# Patient Record
Sex: MALE | URBAN - METROPOLITAN AREA
[De-identification: names, ages, dates, MRNs, and addresses within clinical notes are randomized per-mention and may not be internally consistent; named-entity substitution may affect disease eponyms.]

---

## 2023-01-15 ENCOUNTER — HOSPITAL ENCOUNTER (INPATIENT)
Facility: MEDICAL CENTER | Age: 68
LOS: 10 days | DRG: 339 | End: 2023-01-25
Attending: EMERGENCY MEDICINE | Admitting: SURGERY
Payer: MEDICARE

## 2023-01-15 ENCOUNTER — ANESTHESIA EVENT (OUTPATIENT)
Dept: SURGERY | Facility: MEDICAL CENTER | Age: 68
DRG: 339 | End: 2023-01-15
Payer: MEDICARE

## 2023-01-15 ENCOUNTER — ANESTHESIA (OUTPATIENT)
Dept: SURGERY | Facility: MEDICAL CENTER | Age: 68
DRG: 339 | End: 2023-01-15
Payer: MEDICARE

## 2023-01-15 ENCOUNTER — APPOINTMENT (OUTPATIENT)
Dept: RADIOLOGY | Facility: MEDICAL CENTER | Age: 68
DRG: 339 | End: 2023-01-15
Attending: EMERGENCY MEDICINE
Payer: MEDICARE

## 2023-01-15 DIAGNOSIS — K35.32 ACUTE APPENDICITIS WITH PERFORATION AND LOCALIZED PERITONITIS, WITHOUT ABSCESS OR GANGRENE: ICD-10-CM

## 2023-01-15 DIAGNOSIS — K35.30 ACUTE APPENDICITIS WITH LOCALIZED PERITONITIS, WITHOUT PERFORATION, ABSCESS, OR GANGRENE: ICD-10-CM

## 2023-01-15 LAB
ALBUMIN SERPL BCP-MCNC: 4.7 G/DL (ref 3.2–4.9)
ALBUMIN/GLOB SERPL: 1.6 G/DL
ALP SERPL-CCNC: 99 U/L (ref 30–99)
ALT SERPL-CCNC: 16 U/L (ref 2–50)
ANION GAP SERPL CALC-SCNC: 12 MMOL/L (ref 7–16)
APPEARANCE UR: CLEAR
AST SERPL-CCNC: 16 U/L (ref 12–45)
BACTERIA #/AREA URNS HPF: NEGATIVE /HPF
BASOPHILS # BLD AUTO: 0.2 % (ref 0–1.8)
BASOPHILS # BLD: 0.05 K/UL (ref 0–0.12)
BILIRUB SERPL-MCNC: 0.9 MG/DL (ref 0.1–1.5)
BILIRUB UR QL STRIP.AUTO: NEGATIVE
BUN SERPL-MCNC: 13 MG/DL (ref 8–22)
CALCIUM ALBUM COR SERPL-MCNC: 9.1 MG/DL (ref 8.5–10.5)
CALCIUM SERPL-MCNC: 9.7 MG/DL (ref 8.5–10.5)
CHLORIDE SERPL-SCNC: 97 MMOL/L (ref 96–112)
CO2 SERPL-SCNC: 28 MMOL/L (ref 20–33)
COLOR UR: YELLOW
CREAT SERPL-MCNC: 0.99 MG/DL (ref 0.5–1.4)
EOSINOPHIL # BLD AUTO: 0.01 K/UL (ref 0–0.51)
EOSINOPHIL NFR BLD: 0 % (ref 0–6.9)
EPI CELLS #/AREA URNS HPF: NEGATIVE /HPF
ERYTHROCYTE [DISTWIDTH] IN BLOOD BY AUTOMATED COUNT: 41 FL (ref 35.9–50)
GFR SERPLBLD CREATININE-BSD FMLA CKD-EPI: 83 ML/MIN/1.73 M 2
GLOBULIN SER CALC-MCNC: 3 G/DL (ref 1.9–3.5)
GLUCOSE SERPL-MCNC: 125 MG/DL (ref 65–99)
GLUCOSE UR STRIP.AUTO-MCNC: NEGATIVE MG/DL
HCT VFR BLD AUTO: 50.8 % (ref 42–52)
HGB BLD-MCNC: 17.5 G/DL (ref 14–18)
HYALINE CASTS #/AREA URNS LPF: ABNORMAL /LPF
IMM GRANULOCYTES # BLD AUTO: 0.13 K/UL (ref 0–0.11)
IMM GRANULOCYTES NFR BLD AUTO: 0.6 % (ref 0–0.9)
KETONES UR STRIP.AUTO-MCNC: 15 MG/DL
LACTATE SERPL-SCNC: 1.3 MMOL/L (ref 0.5–2)
LACTATE SERPL-SCNC: 2.3 MMOL/L (ref 0.5–2)
LEUKOCYTE ESTERASE UR QL STRIP.AUTO: NEGATIVE
LIPASE SERPL-CCNC: 18 U/L (ref 11–82)
LYMPHOCYTES # BLD AUTO: 0.72 K/UL (ref 1–4.8)
LYMPHOCYTES NFR BLD: 3.3 % (ref 22–41)
MCH RBC QN AUTO: 30.4 PG (ref 27–33)
MCHC RBC AUTO-ENTMCNC: 34.4 G/DL (ref 33.7–35.3)
MCV RBC AUTO: 88.3 FL (ref 81.4–97.8)
MICRO URNS: ABNORMAL
MONOCYTES # BLD AUTO: 1.56 K/UL (ref 0–0.85)
MONOCYTES NFR BLD AUTO: 7.2 % (ref 0–13.4)
NEUTROPHILS # BLD AUTO: 19.29 K/UL (ref 1.82–7.42)
NEUTROPHILS NFR BLD: 88.7 % (ref 44–72)
NITRITE UR QL STRIP.AUTO: NEGATIVE
NRBC # BLD AUTO: 0 K/UL
NRBC BLD-RTO: 0 /100 WBC
PH UR STRIP.AUTO: 5.5 [PH] (ref 5–8)
PLATELET # BLD AUTO: 251 K/UL (ref 164–446)
PMV BLD AUTO: 9.3 FL (ref 9–12.9)
POTASSIUM SERPL-SCNC: 3.9 MMOL/L (ref 3.6–5.5)
PROT SERPL-MCNC: 7.7 G/DL (ref 6–8.2)
PROT UR QL STRIP: 30 MG/DL
RBC # BLD AUTO: 5.75 M/UL (ref 4.7–6.1)
RBC # URNS HPF: ABNORMAL /HPF
RBC UR QL AUTO: ABNORMAL
SODIUM SERPL-SCNC: 137 MMOL/L (ref 135–145)
SP GR UR STRIP.AUTO: 1.03
UROBILINOGEN UR STRIP.AUTO-MCNC: 0.2 MG/DL
WBC # BLD AUTO: 21.8 K/UL (ref 4.8–10.8)
WBC #/AREA URNS HPF: ABNORMAL /HPF

## 2023-01-15 PROCEDURE — 87040 BLOOD CULTURE FOR BACTERIA: CPT

## 2023-01-15 PROCEDURE — 96375 TX/PRO/DX INJ NEW DRUG ADDON: CPT

## 2023-01-15 PROCEDURE — 160002 HCHG RECOVERY MINUTES (STAT): Performed by: SURGERY

## 2023-01-15 PROCEDURE — 83690 ASSAY OF LIPASE: CPT

## 2023-01-15 PROCEDURE — 700117 HCHG RX CONTRAST REV CODE 255: Performed by: EMERGENCY MEDICINE

## 2023-01-15 PROCEDURE — 700101 HCHG RX REV CODE 250: Performed by: EMERGENCY MEDICINE

## 2023-01-15 PROCEDURE — 99222 1ST HOSP IP/OBS MODERATE 55: CPT | Mod: 57 | Performed by: SURGERY

## 2023-01-15 PROCEDURE — 80053 COMPREHEN METABOLIC PANEL: CPT

## 2023-01-15 PROCEDURE — 160009 HCHG ANES TIME/MIN: Performed by: SURGERY

## 2023-01-15 PROCEDURE — 99140 ANES COMP EMERGENCY COND: CPT | Performed by: ANESTHESIOLOGY

## 2023-01-15 PROCEDURE — 00840 ANES IPER PX LOWER ABD NOS: CPT | Performed by: ANESTHESIOLOGY

## 2023-01-15 PROCEDURE — 160036 HCHG PACU - EA ADDL 30 MINS PHASE I: Performed by: SURGERY

## 2023-01-15 PROCEDURE — 99291 CRITICAL CARE FIRST HOUR: CPT

## 2023-01-15 PROCEDURE — 36415 COLL VENOUS BLD VENIPUNCTURE: CPT

## 2023-01-15 PROCEDURE — 700111 HCHG RX REV CODE 636 W/ 250 OVERRIDE (IP): Performed by: EMERGENCY MEDICINE

## 2023-01-15 PROCEDURE — 700105 HCHG RX REV CODE 258: Performed by: EMERGENCY MEDICINE

## 2023-01-15 PROCEDURE — 700111 HCHG RX REV CODE 636 W/ 250 OVERRIDE (IP): Performed by: SURGERY

## 2023-01-15 PROCEDURE — 81001 URINALYSIS AUTO W/SCOPE: CPT

## 2023-01-15 PROCEDURE — 87186 SC STD MICRODIL/AGAR DIL: CPT

## 2023-01-15 PROCEDURE — 700101 HCHG RX REV CODE 250: Performed by: ANESTHESIOLOGY

## 2023-01-15 PROCEDURE — 96365 THER/PROPH/DIAG IV INF INIT: CPT

## 2023-01-15 PROCEDURE — 88304 TISSUE EXAM BY PATHOLOGIST: CPT

## 2023-01-15 PROCEDURE — 700111 HCHG RX REV CODE 636 W/ 250 OVERRIDE (IP): Performed by: ANESTHESIOLOGY

## 2023-01-15 PROCEDURE — 85025 COMPLETE CBC W/AUTO DIFF WBC: CPT

## 2023-01-15 PROCEDURE — 700105 HCHG RX REV CODE 258: Performed by: ANESTHESIOLOGY

## 2023-01-15 PROCEDURE — 160041 HCHG SURGERY MINUTES - EA ADDL 1 MIN LEVEL 4: Performed by: SURGERY

## 2023-01-15 PROCEDURE — 160048 HCHG OR STATISTICAL LEVEL 1-5: Performed by: SURGERY

## 2023-01-15 PROCEDURE — 770001 HCHG ROOM/CARE - MED/SURG/GYN PRIV*

## 2023-01-15 PROCEDURE — 87077 CULTURE AEROBIC IDENTIFY: CPT

## 2023-01-15 PROCEDURE — 160035 HCHG PACU - 1ST 60 MINS PHASE I: Performed by: SURGERY

## 2023-01-15 PROCEDURE — 83605 ASSAY OF LACTIC ACID: CPT

## 2023-01-15 PROCEDURE — 44970 LAPAROSCOPY APPENDECTOMY: CPT | Mod: AS | Performed by: SPECIALIST

## 2023-01-15 PROCEDURE — 110371 HCHG SHELL REV 272: Performed by: SURGERY

## 2023-01-15 PROCEDURE — 160029 HCHG SURGERY MINUTES - 1ST 30 MINS LEVEL 4: Performed by: SURGERY

## 2023-01-15 PROCEDURE — 44970 LAPAROSCOPY APPENDECTOMY: CPT | Performed by: SURGERY

## 2023-01-15 PROCEDURE — 0DTJ4ZZ RESECTION OF APPENDIX, PERCUTANEOUS ENDOSCOPIC APPROACH: ICD-10-PCS | Performed by: SURGERY

## 2023-01-15 PROCEDURE — 74177 CT ABD & PELVIS W/CONTRAST: CPT

## 2023-01-15 RX ORDER — HYDROMORPHONE HYDROCHLORIDE 1 MG/ML
0.2 INJECTION, SOLUTION INTRAMUSCULAR; INTRAVENOUS; SUBCUTANEOUS
Status: DISCONTINUED | OUTPATIENT
Start: 2023-01-15 | End: 2023-01-16

## 2023-01-15 RX ORDER — HYDROMORPHONE HYDROCHLORIDE 1 MG/ML
0.5 INJECTION, SOLUTION INTRAMUSCULAR; INTRAVENOUS; SUBCUTANEOUS ONCE
Status: COMPLETED | OUTPATIENT
Start: 2023-01-15 | End: 2023-01-15

## 2023-01-15 RX ORDER — SODIUM CHLORIDE, SODIUM LACTATE, POTASSIUM CHLORIDE, CALCIUM CHLORIDE 600; 310; 30; 20 MG/100ML; MG/100ML; MG/100ML; MG/100ML
INJECTION, SOLUTION INTRAVENOUS CONTINUOUS
Status: DISCONTINUED | OUTPATIENT
Start: 2023-01-15 | End: 2023-01-21

## 2023-01-15 RX ORDER — OXYCODONE HYDROCHLORIDE 10 MG/1
10 TABLET ORAL
Status: DISCONTINUED | OUTPATIENT
Start: 2023-01-15 | End: 2023-01-25

## 2023-01-15 RX ORDER — OXYCODONE HCL 5 MG/5 ML
10 SOLUTION, ORAL ORAL
Status: COMPLETED | OUTPATIENT
Start: 2023-01-15 | End: 2023-01-16

## 2023-01-15 RX ORDER — ONDANSETRON 4 MG/1
4 TABLET, ORALLY DISINTEGRATING ORAL EVERY 4 HOURS PRN
Status: DISCONTINUED | OUTPATIENT
Start: 2023-01-15 | End: 2023-01-25 | Stop reason: HOSPADM

## 2023-01-15 RX ORDER — METRONIDAZOLE 500 MG/100ML
500 INJECTION, SOLUTION INTRAVENOUS EVERY 8 HOURS
Status: DISCONTINUED | OUTPATIENT
Start: 2023-01-16 | End: 2023-01-19

## 2023-01-15 RX ORDER — SODIUM CHLORIDE, SODIUM LACTATE, POTASSIUM CHLORIDE, CALCIUM CHLORIDE 600; 310; 30; 20 MG/100ML; MG/100ML; MG/100ML; MG/100ML
1000 INJECTION, SOLUTION INTRAVENOUS ONCE
Status: COMPLETED | OUTPATIENT
Start: 2023-01-15 | End: 2023-01-15

## 2023-01-15 RX ORDER — DIPHENHYDRAMINE HYDROCHLORIDE 50 MG/ML
12.5 INJECTION INTRAMUSCULAR; INTRAVENOUS
Status: DISCONTINUED | OUTPATIENT
Start: 2023-01-15 | End: 2023-01-16

## 2023-01-15 RX ORDER — BUPIVACAINE HYDROCHLORIDE 2.5 MG/ML
INJECTION, SOLUTION EPIDURAL; INFILTRATION; INTRACAUDAL
Status: DISCONTINUED | OUTPATIENT
Start: 2023-01-15 | End: 2023-01-15 | Stop reason: HOSPADM

## 2023-01-15 RX ORDER — KETOROLAC TROMETHAMINE 30 MG/ML
15 INJECTION, SOLUTION INTRAMUSCULAR; INTRAVENOUS EVERY 6 HOURS
Status: COMPLETED | OUTPATIENT
Start: 2023-01-16 | End: 2023-01-18

## 2023-01-15 RX ORDER — ZOLPIDEM TARTRATE 10 MG/1
10 TABLET ORAL NIGHTLY PRN
COMMUNITY

## 2023-01-15 RX ORDER — ENEMA 19; 7 G/133ML; G/133ML
1 ENEMA RECTAL
Status: DISCONTINUED | OUTPATIENT
Start: 2023-01-15 | End: 2023-01-25 | Stop reason: HOSPADM

## 2023-01-15 RX ORDER — DOCUSATE SODIUM 100 MG/1
100 CAPSULE, LIQUID FILLED ORAL 2 TIMES DAILY
Status: DISCONTINUED | OUTPATIENT
Start: 2023-01-15 | End: 2023-01-25 | Stop reason: HOSPADM

## 2023-01-15 RX ORDER — ROCURONIUM BROMIDE 10 MG/ML
INJECTION, SOLUTION INTRAVENOUS PRN
Status: DISCONTINUED | OUTPATIENT
Start: 2023-01-15 | End: 2023-01-15 | Stop reason: SURG

## 2023-01-15 RX ORDER — SODIUM CHLORIDE, SODIUM LACTATE, POTASSIUM CHLORIDE, CALCIUM CHLORIDE 600; 310; 30; 20 MG/100ML; MG/100ML; MG/100ML; MG/100ML
INJECTION, SOLUTION INTRAVENOUS
Status: DISCONTINUED | OUTPATIENT
Start: 2023-01-15 | End: 2023-01-15 | Stop reason: SURG

## 2023-01-15 RX ORDER — KETOROLAC TROMETHAMINE 30 MG/ML
INJECTION, SOLUTION INTRAMUSCULAR; INTRAVENOUS PRN
Status: DISCONTINUED | OUTPATIENT
Start: 2023-01-15 | End: 2023-01-15 | Stop reason: SURG

## 2023-01-15 RX ORDER — ONDANSETRON 2 MG/ML
4 INJECTION INTRAMUSCULAR; INTRAVENOUS ONCE
Status: COMPLETED | OUTPATIENT
Start: 2023-01-15 | End: 2023-01-15

## 2023-01-15 RX ORDER — HYDROMORPHONE HYDROCHLORIDE 2 MG/ML
INJECTION, SOLUTION INTRAMUSCULAR; INTRAVENOUS; SUBCUTANEOUS PRN
Status: DISCONTINUED | OUTPATIENT
Start: 2023-01-15 | End: 2023-01-15 | Stop reason: SURG

## 2023-01-15 RX ORDER — LABETALOL HYDROCHLORIDE 5 MG/ML
5 INJECTION, SOLUTION INTRAVENOUS
Status: DISCONTINUED | OUTPATIENT
Start: 2023-01-15 | End: 2023-01-16

## 2023-01-15 RX ORDER — MIDAZOLAM HYDROCHLORIDE 1 MG/ML
INJECTION INTRAMUSCULAR; INTRAVENOUS PRN
Status: DISCONTINUED | OUTPATIENT
Start: 2023-01-15 | End: 2023-01-15 | Stop reason: SURG

## 2023-01-15 RX ORDER — SUMATRIPTAN 100 MG/1
50 TABLET, FILM COATED ORAL
COMMUNITY

## 2023-01-15 RX ORDER — ONDANSETRON 2 MG/ML
4 INJECTION INTRAMUSCULAR; INTRAVENOUS
Status: DISCONTINUED | OUTPATIENT
Start: 2023-01-15 | End: 2023-01-16

## 2023-01-15 RX ORDER — NEOSTIGMINE METHYLSULFATE 1 MG/ML
INJECTION, SOLUTION INTRAVENOUS PRN
Status: DISCONTINUED | OUTPATIENT
Start: 2023-01-15 | End: 2023-01-15 | Stop reason: SURG

## 2023-01-15 RX ORDER — OXYCODONE HCL 5 MG/5 ML
5 SOLUTION, ORAL ORAL
Status: COMPLETED | OUTPATIENT
Start: 2023-01-15 | End: 2023-01-16

## 2023-01-15 RX ORDER — ONDANSETRON 2 MG/ML
4 INJECTION INTRAMUSCULAR; INTRAVENOUS EVERY 4 HOURS PRN
Status: DISCONTINUED | OUTPATIENT
Start: 2023-01-15 | End: 2023-01-25

## 2023-01-15 RX ORDER — HALOPERIDOL 5 MG/ML
1 INJECTION INTRAMUSCULAR
Status: DISCONTINUED | OUTPATIENT
Start: 2023-01-15 | End: 2023-01-16

## 2023-01-15 RX ORDER — MIDAZOLAM HYDROCHLORIDE 1 MG/ML
1 INJECTION INTRAMUSCULAR; INTRAVENOUS
Status: DISCONTINUED | OUTPATIENT
Start: 2023-01-15 | End: 2023-01-16

## 2023-01-15 RX ORDER — IBUPROFEN 200 MG
400 TABLET ORAL EVERY 6 HOURS PRN
COMMUNITY

## 2023-01-15 RX ORDER — HYDROMORPHONE HYDROCHLORIDE 1 MG/ML
0.1 INJECTION, SOLUTION INTRAMUSCULAR; INTRAVENOUS; SUBCUTANEOUS
Status: DISCONTINUED | OUTPATIENT
Start: 2023-01-15 | End: 2023-01-16

## 2023-01-15 RX ORDER — ACETAMINOPHEN 500 MG
1000 TABLET ORAL EVERY 4 HOURS PRN
Status: DISCONTINUED | OUTPATIENT
Start: 2023-01-15 | End: 2023-01-16

## 2023-01-15 RX ORDER — OXYCODONE HYDROCHLORIDE 5 MG/1
5 TABLET ORAL
Status: DISCONTINUED | OUTPATIENT
Start: 2023-01-15 | End: 2023-01-25

## 2023-01-15 RX ORDER — AMOXICILLIN 250 MG
1 CAPSULE ORAL
Status: DISCONTINUED | OUTPATIENT
Start: 2023-01-15 | End: 2023-01-25 | Stop reason: HOSPADM

## 2023-01-15 RX ORDER — METRONIDAZOLE 500 MG/100ML
500 INJECTION, SOLUTION INTRAVENOUS ONCE
Status: COMPLETED | OUTPATIENT
Start: 2023-01-15 | End: 2023-01-15

## 2023-01-15 RX ORDER — POLYETHYLENE GLYCOL 3350 17 G/17G
1 POWDER, FOR SOLUTION ORAL 2 TIMES DAILY
Status: DISCONTINUED | OUTPATIENT
Start: 2023-01-15 | End: 2023-01-25 | Stop reason: HOSPADM

## 2023-01-15 RX ORDER — AMOXICILLIN 250 MG
1 CAPSULE ORAL NIGHTLY
Status: DISCONTINUED | OUTPATIENT
Start: 2023-01-15 | End: 2023-01-25 | Stop reason: HOSPADM

## 2023-01-15 RX ORDER — IBUPROFEN 800 MG/1
800 TABLET ORAL 3 TIMES DAILY PRN
Status: DISCONTINUED | OUTPATIENT
Start: 2023-01-19 | End: 2023-01-25 | Stop reason: HOSPADM

## 2023-01-15 RX ORDER — GLYCOPYRROLATE 0.2 MG/ML
INJECTION INTRAMUSCULAR; INTRAVENOUS PRN
Status: DISCONTINUED | OUTPATIENT
Start: 2023-01-15 | End: 2023-01-15 | Stop reason: SURG

## 2023-01-15 RX ORDER — HYDROMORPHONE HYDROCHLORIDE 1 MG/ML
0.4 INJECTION, SOLUTION INTRAMUSCULAR; INTRAVENOUS; SUBCUTANEOUS
Status: DISCONTINUED | OUTPATIENT
Start: 2023-01-15 | End: 2023-01-16

## 2023-01-15 RX ORDER — BISACODYL 10 MG
10 SUPPOSITORY, RECTAL RECTAL
Status: DISCONTINUED | OUTPATIENT
Start: 2023-01-15 | End: 2023-01-25 | Stop reason: HOSPADM

## 2023-01-15 RX ORDER — MEPERIDINE HYDROCHLORIDE 25 MG/ML
6.25 INJECTION INTRAMUSCULAR; INTRAVENOUS; SUBCUTANEOUS
Status: DISCONTINUED | OUTPATIENT
Start: 2023-01-15 | End: 2023-01-16

## 2023-01-15 RX ORDER — CEFTRIAXONE 2 G/1
2000 INJECTION, POWDER, FOR SOLUTION INTRAMUSCULAR; INTRAVENOUS ONCE
Status: COMPLETED | OUTPATIENT
Start: 2023-01-15 | End: 2023-01-15

## 2023-01-15 RX ORDER — SODIUM CHLORIDE, SODIUM LACTATE, POTASSIUM CHLORIDE, CALCIUM CHLORIDE 600; 310; 30; 20 MG/100ML; MG/100ML; MG/100ML; MG/100ML
INJECTION, SOLUTION INTRAVENOUS CONTINUOUS
Status: DISCONTINUED | OUTPATIENT
Start: 2023-01-15 | End: 2023-01-16

## 2023-01-15 RX ADMIN — PROPOFOL 200 MG: 10 INJECTION, EMULSION INTRAVENOUS at 21:41

## 2023-01-15 RX ADMIN — CEFTRIAXONE SODIUM 2000 MG: 2 INJECTION, POWDER, FOR SOLUTION INTRAMUSCULAR; INTRAVENOUS at 19:45

## 2023-01-15 RX ADMIN — HYDROMORPHONE HYDROCHLORIDE 2 MG: 2 INJECTION INTRAMUSCULAR; INTRAVENOUS; SUBCUTANEOUS at 21:41

## 2023-01-15 RX ADMIN — SODIUM CHLORIDE, POTASSIUM CHLORIDE, SODIUM LACTATE AND CALCIUM CHLORIDE: 600; 310; 30; 20 INJECTION, SOLUTION INTRAVENOUS at 21:49

## 2023-01-15 RX ADMIN — MEPERIDINE HYDROCHLORIDE 6.25 MG: 25 INJECTION INTRAMUSCULAR; INTRAVENOUS; SUBCUTANEOUS at 23:27

## 2023-01-15 RX ADMIN — MIDAZOLAM HYDROCHLORIDE 2 MG: 1 INJECTION, SOLUTION INTRAMUSCULAR; INTRAVENOUS at 21:36

## 2023-01-15 RX ADMIN — GLYCOPYRROLATE 1 MG: 0.2 INJECTION INTRAMUSCULAR; INTRAVENOUS at 22:11

## 2023-01-15 RX ADMIN — NEOSTIGMINE METHYLSULFATE 5 MG: 1 INJECTION INTRAVENOUS at 22:11

## 2023-01-15 RX ADMIN — ROCURONIUM BROMIDE 20 MG: 10 INJECTION, SOLUTION INTRAVENOUS at 21:53

## 2023-01-15 RX ADMIN — IOHEXOL 100 ML: 350 INJECTION, SOLUTION INTRAVENOUS at 20:31

## 2023-01-15 RX ADMIN — KETOROLAC TROMETHAMINE 30 MG: 30 INJECTION, SOLUTION INTRAMUSCULAR at 22:05

## 2023-01-15 RX ADMIN — SODIUM CHLORIDE, POTASSIUM CHLORIDE, SODIUM LACTATE AND CALCIUM CHLORIDE 1000 ML: 600; 310; 30; 20 INJECTION, SOLUTION INTRAVENOUS at 20:00

## 2023-01-15 RX ADMIN — Medication 150 MG: at 21:41

## 2023-01-15 RX ADMIN — FENTANYL CITRATE 50 MCG: 50 INJECTION INTRAMUSCULAR; INTRAVENOUS at 19:52

## 2023-01-15 RX ADMIN — HYDROMORPHONE HYDROCHLORIDE 0.5 MG: 1 INJECTION, SOLUTION INTRAMUSCULAR; INTRAVENOUS; SUBCUTANEOUS at 21:15

## 2023-01-15 RX ADMIN — LABETALOL HYDROCHLORIDE 5 MG: 5 INJECTION, SOLUTION INTRAVENOUS at 23:37

## 2023-01-15 RX ADMIN — ONDANSETRON HYDROCHLORIDE 4 MG: 2 SOLUTION INTRAMUSCULAR; INTRAVENOUS at 19:52

## 2023-01-15 RX ADMIN — ROCURONIUM BROMIDE 10 MG: 10 INJECTION, SOLUTION INTRAVENOUS at 21:41

## 2023-01-15 RX ADMIN — METRONIDAZOLE 500 MG: 500 INJECTION, SOLUTION INTRAVENOUS at 20:00

## 2023-01-15 ASSESSMENT — PAIN DESCRIPTION - PAIN TYPE
TYPE: SURGICAL PAIN

## 2023-01-15 ASSESSMENT — PAIN SCALES - GENERAL: PAIN_LEVEL: 0

## 2023-01-16 LAB — PATHOLOGY CONSULT NOTE: NORMAL

## 2023-01-16 PROCEDURE — 700102 HCHG RX REV CODE 250 W/ 637 OVERRIDE(OP): Performed by: ANESTHESIOLOGY

## 2023-01-16 PROCEDURE — 700102 HCHG RX REV CODE 250 W/ 637 OVERRIDE(OP): Performed by: SURGERY

## 2023-01-16 PROCEDURE — 99024 POSTOP FOLLOW-UP VISIT: CPT | Performed by: SURGERY

## 2023-01-16 PROCEDURE — 700105 HCHG RX REV CODE 258: Performed by: SURGERY

## 2023-01-16 PROCEDURE — A9270 NON-COVERED ITEM OR SERVICE: HCPCS | Performed by: SURGERY

## 2023-01-16 PROCEDURE — 770001 HCHG ROOM/CARE - MED/SURG/GYN PRIV*

## 2023-01-16 PROCEDURE — 700111 HCHG RX REV CODE 636 W/ 250 OVERRIDE (IP): Performed by: SURGERY

## 2023-01-16 PROCEDURE — A9270 NON-COVERED ITEM OR SERVICE: HCPCS | Performed by: ANESTHESIOLOGY

## 2023-01-16 PROCEDURE — 700101 HCHG RX REV CODE 250: Performed by: SURGERY

## 2023-01-16 PROCEDURE — 51798 US URINE CAPACITY MEASURE: CPT

## 2023-01-16 RX ADMIN — OXYCODONE HYDROCHLORIDE 10 MG: 5 SOLUTION ORAL at 00:13

## 2023-01-16 RX ADMIN — SENNOSIDES AND DOCUSATE SODIUM 1 TABLET: 50; 8.6 TABLET ORAL at 06:33

## 2023-01-16 RX ADMIN — KETOROLAC TROMETHAMINE 15 MG: 30 INJECTION, SOLUTION INTRAMUSCULAR; INTRAVENOUS at 03:11

## 2023-01-16 RX ADMIN — METRONIDAZOLE 500 MG: 5 INJECTION, SOLUTION INTRAVENOUS at 12:57

## 2023-01-16 RX ADMIN — MAGNESIUM HYDROXIDE 30 ML: 400 SUSPENSION ORAL at 06:33

## 2023-01-16 RX ADMIN — METRONIDAZOLE 500 MG: 5 INJECTION, SOLUTION INTRAVENOUS at 20:17

## 2023-01-16 RX ADMIN — KETOROLAC TROMETHAMINE 15 MG: 30 INJECTION, SOLUTION INTRAMUSCULAR; INTRAVENOUS at 16:53

## 2023-01-16 RX ADMIN — CEFTRIAXONE 2000 MG: 10 INJECTION, POWDER, FOR SOLUTION INTRAVENOUS at 16:53

## 2023-01-16 RX ADMIN — SODIUM CHLORIDE, POTASSIUM CHLORIDE, SODIUM LACTATE AND CALCIUM CHLORIDE: 600; 310; 30; 20 INJECTION, SOLUTION INTRAVENOUS at 20:04

## 2023-01-16 RX ADMIN — POLYETHYLENE GLYCOL 3350 1 PACKET: 17 POWDER, FOR SOLUTION ORAL at 06:32

## 2023-01-16 RX ADMIN — SODIUM CHLORIDE, POTASSIUM CHLORIDE, SODIUM LACTATE AND CALCIUM CHLORIDE: 600; 310; 30; 20 INJECTION, SOLUTION INTRAVENOUS at 01:38

## 2023-01-16 RX ADMIN — METRONIDAZOLE 500 MG: 5 INJECTION, SOLUTION INTRAVENOUS at 03:07

## 2023-01-16 RX ADMIN — SENNOSIDES AND DOCUSATE SODIUM 1 TABLET: 50; 8.6 TABLET ORAL at 20:13

## 2023-01-16 RX ADMIN — DOCUSATE SODIUM 100 MG: 100 CAPSULE, LIQUID FILLED ORAL at 06:32

## 2023-01-16 RX ADMIN — ACETAMINOPHEN 1000 MG: 500 TABLET ORAL at 00:14

## 2023-01-16 RX ADMIN — KETOROLAC TROMETHAMINE 15 MG: 30 INJECTION, SOLUTION INTRAMUSCULAR; INTRAVENOUS at 10:12

## 2023-01-16 RX ADMIN — KETOROLAC TROMETHAMINE 15 MG: 30 INJECTION, SOLUTION INTRAMUSCULAR; INTRAVENOUS at 21:55

## 2023-01-16 ASSESSMENT — LIFESTYLE VARIABLES
HAVE YOU EVER FELT YOU SHOULD CUT DOWN ON YOUR DRINKING: NO
HOW MANY TIMES IN THE PAST YEAR HAVE YOU HAD 5 OR MORE DRINKS IN A DAY: 0
HAVE PEOPLE ANNOYED YOU BY CRITICIZING YOUR DRINKING: NO
TOTAL SCORE: 0
TOTAL SCORE: 0
CONSUMPTION TOTAL: NEGATIVE
DOES PATIENT WANT TO STOP DRINKING: NO
ALCOHOL_USE: NO
AVERAGE NUMBER OF DAYS PER WEEK YOU HAVE A DRINK CONTAINING ALCOHOL: 0
TOTAL SCORE: 0
EVER FELT BAD OR GUILTY ABOUT YOUR DRINKING: NO
ON A TYPICAL DAY WHEN YOU DRINK ALCOHOL HOW MANY DRINKS DO YOU HAVE: 0
EVER HAD A DRINK FIRST THING IN THE MORNING TO STEADY YOUR NERVES TO GET RID OF A HANGOVER: NO

## 2023-01-16 ASSESSMENT — PATIENT HEALTH QUESTIONNAIRE - PHQ9
2. FEELING DOWN, DEPRESSED, IRRITABLE, OR HOPELESS: NOT AT ALL
SUM OF ALL RESPONSES TO PHQ9 QUESTIONS 1 AND 2: 0
1. LITTLE INTEREST OR PLEASURE IN DOING THINGS: NOT AT ALL

## 2023-01-16 ASSESSMENT — PAIN DESCRIPTION - PAIN TYPE
TYPE: SURGICAL PAIN

## 2023-01-16 ASSESSMENT — COGNITIVE AND FUNCTIONAL STATUS - GENERAL
STANDING UP FROM CHAIR USING ARMS: A LITTLE
MOVING FROM LYING ON BACK TO SITTING ON SIDE OF FLAT BED: A LITTLE
SUGGESTED CMS G CODE MODIFIER DAILY ACTIVITY: CJ
WALKING IN HOSPITAL ROOM: A LITTLE
MOVING TO AND FROM BED TO CHAIR: A LITTLE
TURNING FROM BACK TO SIDE WHILE IN FLAT BAD: A LITTLE
HELP NEEDED FOR BATHING: A LITTLE
TOILETING: A LITTLE
DAILY ACTIVITIY SCORE: 21
CLIMB 3 TO 5 STEPS WITH RAILING: A LITTLE
DRESSING REGULAR LOWER BODY CLOTHING: A LITTLE
MOBILITY SCORE: 18
SUGGESTED CMS G CODE MODIFIER MOBILITY: CK

## 2023-01-16 ASSESSMENT — COPD QUESTIONNAIRES
DO YOU EVER COUGH UP ANY MUCUS OR PHLEGM?: NO/ONLY WITH OCCASIONAL COLDS OR INFECTIONS
HAVE YOU SMOKED AT LEAST 100 CIGARETTES IN YOUR ENTIRE LIFE: NO/DON'T KNOW
COPD SCREENING SCORE: 2
DURING THE PAST 4 WEEKS HOW MUCH DID YOU FEEL SHORT OF BREATH: NONE/LITTLE OF THE TIME

## 2023-01-16 NOTE — ANESTHESIA POSTPROCEDURE EVALUATION
Patient: Clinton Garcia    Procedure Summary     Date: 01/15/23 Room / Location: [place] [address] / SURGERY [facility]    Anesthesia Start: 2133 Anesthesia Stop: 2223    Procedure: APPENDECTOMY, LAPAROSCOPIC (Abdomen) Diagnosis: (PERFORATED APPENDICITIS)    Surgeons: Heidi Dee M.D. Responsible Provider: Galdino Shirley M.D.    Anesthesia Type: general ASA Status: 1 - Emergent          Final Anesthesia Type: general  Last vitals  BP   Blood Pressure : (!) 184/109    Temp   37.7 °C (99.9 °F)    Pulse   88   Resp   15    SpO2   96 %      Anesthesia Post Evaluation    Patient location during evaluation: PACU  Patient participation: complete - patient participated  Level of consciousness: awake and alert  Pain score: 0    Airway patency: patent  Anesthetic complications: no  Cardiovascular status: hemodynamically stable  Respiratory status: acceptable  Hydration status: euvolemic    PONV: none          No notable events documented.

## 2023-01-16 NOTE — ASSESSMENT & PLAN NOTE
2 day hx of RLQ pain, WBC 21K, CT consistent with appendicitis.  1/15 Laparoscopic appendectomy.  BOLIVAR drain to bulb suction.  Clear liquid diet until bowel function returns.  IV antibiotics until leukocytosis resolves.  1/18 Vomiting, abdominal distention.  - KUB with ileus. NPO sips with meds.  1/19 IV antibiotics (Rocephin and flagyl) changed to PO (Levaquin and Flagyl). Okay for sips of juice.  1/20 Slight increase in WBC. Advance to clear liquid diet.  1/21 Leukocytosis. ID consult requested. Zosyn restarted.  1/22 CT with appendicoliths.  1/23 Reglan added. BOLIVAR with 150 cc serosanguineous drainage.  1/24 Slight increase in WBC. Transition to PO (Levaquin and Flagyl). Discontinue BOLIVAR.  1/25 WBC 15.1. Repeat CT abd/pelvis ordered.  [facility].

## 2023-01-16 NOTE — CONSULTS
"  CHIEF COMPLAINT: Right lower quadrant pain.     HISTORY OF PRESENT ILLNESS: The patient is a 67 year-old White man who presents to the Emergency Department with a 2- day history of moderate right lower quadrant abdominal pain. The pain is associated with nausea and vomiting. The patient denies any recent or intercurrent illness. The patient denies any history of previous abdominal surgery.     PAST MEDICAL HISTORY:  has no past medical history on file.    PAST SURGICAL HISTORY:  Ankle, rotator cuff.    ALLERGIES: No Known Allergies    CURRENT MEDICATIONS:    Home Medications       Reviewed by Anuja Morin R.N. (Registered Nurse) on 01/15/23 at 1742  Med List Status: Not Addressed     Medication Last Dose Status        Patient Domingo Taking any Medications                           FAMILY HISTORY: family history is not on file.    SOCIAL HISTORY:  reports that he has never smoked. He has never used smokeless tobacco. He reports that he does not currently use alcohol. He reports that he does not currently use drugs.    REVIEW OF SYSTEMS: Comprehensive review of systems is negative with the exception of the aforementioned HPI, PMH, and PSH bullets in accordance with CMS guidelines.    PHYSICAL EXAMINATION:      Constitutional:     Vital Signs: BP (!) 165/98   Pulse 90   Temp 37.2 °C (98.9 °F) (Temporal)   Resp 18   Ht 1.803 m (5' 11\")   Wt 80.5 kg (177 lb 7.5 oz)   SpO2 95%    General Appearance: alert in no acute distress.   HEENT:    Demonstrates symmetric, reactive pupils.  t. Nares and oropharynx are clear.   Neck:    Supple.    Respiratory:   Inspection: Unlabored respirations, no intercostal retractions, paradoxical motion, or accessory muscle use.     Cardiovascular:   Inspection: The skin is warm.     Abdomen:  Inspection: Abdominal inspection reveals  no scars .   Palpation: Palpation is remarkable for moderate tenderness in the right lower quadrant region.   Extremities:   Examination of the upper " and lower extremities demonstrates no cyanosis edema or clubbing.  Neurologic:     No focal deficits noted.    LABORATORY VALUES:   Recent Labs     01/15/23  1746   WBC 21.8*   RBC 5.75   HEMOGLOBIN 17.5   HEMATOCRIT 50.8   MCV 88.3   MCH 30.4   MCHC 34.4   RDW 41.0   PLATELETCT 251   MPV 9.3     Recent Labs     01/15/23  1746   SODIUM 137   POTASSIUM 3.9   CHLORIDE 97   CO2 28   GLUCOSE 125*   BUN 13   CREATININE 0.99   CALCIUM 9.7     Recent Labs     01/15/23  1746   ASTSGOT 16   ALTSGPT 16   TBILIRUBIN 0.9   ALKPHOSPHAT 99   GLOBULIN 3.0            IMAGING:   CT-ABDOMEN-PELVIS WITH   Final Result         1.  Findings compatible with acute appendicitis      These findings were discussed with the patient's clinician, Han Hinton, on 1/15/2023 8:41 PM.          ASSESSMENT AND PLAN:     Acute appendicitis with localized peritonitis, without perforation, abscess, or gangrene- (present on admission)  Assessment & Plan  2 day hx of RLQ pain  WBC 21K  Abd CT pos for acute appy  Plan lap appy        The patient will be taken to the operating room for laparoscopic appendectomy.  The surgical conduct was discussed in detail. Potential complications including, but not limited to, infection, bleeding, damage to adjacent structures, need to convert to an open procedure, and anesthetic complications were discussed. Operative consent signed.      ____________________________________     Heidi Dee M.D.    DD: 1/15/2023  8:49 PM

## 2023-01-16 NOTE — ED TRIAGE NOTES
"Chief Complaint   Patient presents with    RLQ Pain     C/o RLQ pain since Friday. States he feels bloated. +nausea     denies dysuria, feels like he has had a low grade fever but hasn't been able to actually take his temperature    feels constipated, hasn't had bowel movement in three days    Still has appendix and gallbladder    BP (!) 165/98   Pulse 90   Temp 37.2 °C (98.9 °F) (Temporal)   Resp 18   Ht 1.803 m (5' 11\")   Wt 80.5 kg (177 lb 7.5 oz)   SpO2 95%   BMI 24.75 kg/m²     "

## 2023-01-16 NOTE — ED PROVIDER NOTES
"ED Provider Note    CHIEF COMPLAINT  Chief Complaint   Patient presents with    RLQ Pain     C/o RLQ pain since Friday. States he feels bloated. +nausea       EXTERNAL RECORDS REVIEWED  Other none available    HPI/ROS  LIMITATION TO HISTORY   Select: : None  OUTSIDE HISTORIAN(S):  Family      Clinton Garcia is a 67 y.o. male who presents with right-sided lower abdominal pain.  Patient reports that on Friday he began having some vague abdominal pain, more of a bloating sensation across his abdomen not really in a specific location.  His pain has continued and progressed and has become primarily on the right lower side.  It is sharp in nature, worse with movement, no real radiation.  He has felt feverish, and he does have some nausea did have some vomiting yesterday although none today.  He reports he has not had a bowel movement since Friday either.  Denies any dysuria or hematuria, cough congestion or other recent illness    Had no prior abdominal surgeries and reports no chronic medical problems and does not take any medications on a regular basis    PAST MEDICAL HISTORY   none    SURGICAL HISTORY  patient denies any surgical history    FAMILY HISTORY  History reviewed. No pertinent family history.    SOCIAL HISTORY  Social History     Tobacco Use    Smoking status: Never    Smokeless tobacco: Never   Substance and Sexual Activity    Alcohol use: Not Currently    Drug use: Not Currently    Sexual activity: Not on file       CURRENT MEDICATIONS  Home Medications       Reviewed by Jaylene Reardon RKAYKAY (Registered Nurse) on 01/15/23 at 2055  Med List Status: Complete     Medication Last Dose Status   HYDROmorphone (Dilaudid) injection 0.5 mg  Active   ibuprofen (ADVIL) 200 MG Tab 1/15/2023 Active   Magnesium 100 MG Tab 1/12/2023 Active   metroNIDAZOLE (Flagyl) IVPB 500 mg  Active   multivitamin Tab 1/12/2023 Active   sumatriptan (IMITREX) 100 MG tablet \"COUPLE WEEKS AGO\" Active   zolpidem (AMBIEN) 10 MG Tab " "\"ABOUT A MONTH AGO\" Active                    ALLERGIES  No Known Allergies    PHYSICAL EXAM  VITAL SIGNS: BP (!) 165/98   Pulse 90   Temp 37.2 °C (98.9 °F) (Temporal)   Resp 18   Ht 1.803 m (5' 11\")   Wt 80.5 kg (177 lb 7.5 oz)   SpO2 95%   BMI 24.75 kg/m²      Pulse ox interpretation: I interpret this pulse ox as normal.  Constitutional: Alert uncomfortable  HENT: No signs of trauma, Bilateral external ears normal, Nose normal.   Eyes: Pupils are equal and reactive, Conjunctiva normal, Non-icteric.   Neck: Normal range of motion, No tenderness, Supple, No stridor.   Cardiovascular: Tachycardic, regular, no murmurs.   Thorax & Lungs: Normal breath sounds, No respiratory distress, No wheezing, No chest tenderness.   Abdomen: Bowel sounds normal, Soft, lower quadrant tenderness, No masses, No pulsatile masses. No peritoneal signs.  Skin: Warm, Dry, No erythema, No rash.   Back: No bony tenderness, No CVA tenderness.   Extremities: Intact distal pulses, No edema, No tenderness, No cyanosis,  Negative Shayla's sign.   Musculoskeletal: Good range of motion in all major joints. No tenderness to palpation or major deformities noted.   Neurologic: Alert , Normal motor function, Normal sensory function, No focal deficits noted.   Psychiatric: Affect normal, Judgment normal, Mood normal.               DIAGNOSTIC STUDIES / PROCEDURES    LABS  Labs Reviewed   CBC WITH DIFFERENTIAL - Abnormal; Notable for the following components:       Result Value    WBC 21.8 (*)     Neutrophils-Polys 88.70 (*)     Lymphocytes 3.30 (*)     Neutrophils (Absolute) 19.29 (*)     Lymphs (Absolute) 0.72 (*)     Monos (Absolute) 1.56 (*)     Immature Granulocytes (abs) 0.13 (*)     All other components within normal limits   COMP METABOLIC PANEL - Abnormal; Notable for the following components:    Glucose 125 (*)     All other components within normal limits   URINALYSIS - Abnormal; Notable for the following components:    Ketones 15 (*)     " "Protein 30 (*)     Occult Blood Moderate (*)     All other components within normal limits   LACTIC ACID - Abnormal; Notable for the following components:    Lactic Acid 2.3 (*)     All other components within normal limits   URINE MICROSCOPIC (W/UA) - Abnormal; Notable for the following components:    WBC 0-2 (*)     RBC 5-10 (*)     Hyaline Cast 6-10 (*)     All other components within normal limits   LIPASE   CORRECTED CALCIUM   ESTIMATED GFR   LACTIC ACID   LACTIC ACID   BLOOD CULTURE    Narrative:     1 of 2 for Blood Culture x 2 sites order. Per Hospital  Policy: Only change Specimen Src: to \"Line\" if specified by  physician order.   BLOOD CULTURE    Narrative:     2 of 2 blood culture x2  Sites order. Per Hospital Policy:  Only change Specimen Src: to \"Line\" if specified by physician  order.         RADIOLOGY  I have independently interpreted the diagnostic imaging associated with this visit appears appendicitis and am waiting the final reading from the radiologist.   CT-ABDOMEN-PELVIS WITH   Final Result         1.  Findings compatible with acute appendicitis      These findings were discussed with the patient's clinician, aHn Hinton, on 1/15/2023 8:41 PM.            COURSE & MEDICAL DECISION MAKING      INITIAL ASSESSMENT AND PLAN    7:41 PM  Patient is seen immediately on arrival to his room.  Differential at this point includes but is not limited to acute surgical process such as appendicitis or other surgical process, perforation obstruction, additionally considered are urinary infection.  I have ordered for Rocephin and Flagyl, sepsis bundle, CT, as well as fentanyl, IV fluids and Zofran for his symptoms    Case discussed with radiologist, Dr. Lr    Case is discussed with Dr. Dee from general surgery she will take the patient to the OR tonight      This is a very pleasant 59-year-old male presenting with right-sided abdominal pain and found to have acute appendicitis.  There is not appear to " be a perforation at this point.  He was started on Rocephin, Flagyl, and IV fluids.  He was additionally given fentanyl and Zofran initially for his symptoms and then hydromorphone.  Case is discussed with Dr. Dee as above and he has been admitted for surgery    Is hospitalized in stable condition for surgery    FINAL DIAGNOSIS  1. Acute appendicitis with localized peritonitis, without perforation, abscess, or gangrene           Electronically signed by: Han Hinton M.D., 1/15/2023 7:41 PM

## 2023-01-16 NOTE — ANESTHESIA PROCEDURE NOTES
Airway    Date/Time: 1/15/2023 9:43 PM  Performed by: Galdino Shirley M.D.  Authorized by: Galdino Shirley M.D.     Location:  OR  Urgency:  Elective  Indications for Airway Management:  Anesthesia      Spontaneous Ventilation: absent    Sedation Level:  Deep  Preoxygenated: Yes    Patient Position:  Sniffing  Mask Difficulty Assessment:  1 - vent by mask  Final Airway Type:  Endotracheal airway  Final Endotracheal Airway:  ETT  Cuffed: Yes    Technique Used for Successful ETT Placement:  Direct laryngoscopy    Insertion Site:  Oral  Blade Type:  Consuelo  Laryngoscope Blade/Videolaryngoscope Blade Size:  3  ETT Size (mm):  7.0  Measured from:  Teeth  ETT to Teeth (cm):  24  Placement Verified by: auscultation and capnometry    Cormack-Lehane Classification:  Grade IIa - partial view of glottis  Number of Attempts at Approach:  1

## 2023-01-16 NOTE — ED NOTES
Med rec completed per patient and spouse at bedside.  Allergies reviewed with patient. NKDA.  No outpatient antibiotics within the last 30 days.  Patient's preferred pharmacy for discharge medications: [facility] (Meds-to-Beds).

## 2023-01-16 NOTE — OP REPORT
DATE OF SERVICE:  01/15/2023     PREOPERATIVE DIAGNOSIS:  Acute appendicitis.     POSTOPERATIVE DIAGNOSIS:  Perforated appendicitis.     PROCEDURE:  Laparoscopic appendectomy.     SURGEON:  Heidi Dee MD     ASSISTANT: VADIM Ortiz     ANESTHESIA:  General endotracheal.     ANESTHESIOLOGIST:  Dr. Shirley.     INDICATIONS:  The patient is a 67-year-old male who has been sick for 2-1/2   days, was initially generalized and now right lower quadrant abdominal pain.    He was brought to the Emergency Room where he was found on CT scan to have   acute appendicitis.  He is being brought at this time for laparoscopic   appendectomy. The indications for a surgical assistant in this surgery were   indicated due to complexity of the procedure. Their role included aiding in incision,   retraction, holding devices including camera for laparoscopic procedure, and closure of the wound.        FINDINGS:  Purulent drainage within the abdominal cavity with a ruptured   appendix, fecalith was found and removed.     DESCRIPTION OF PROCEDURE:  After the patient was identified and consented, he   was brought to the operating room and placed in supine position.  The patient   underwent general endotracheal anesthetic.  The patient's abdomen was prepped   and draped in sterile fashion.  The periumbilical was anesthetized with 0.25%   Marcaine.  One 5 mm incision was made, the abdomen was lifted up, Veress   needle was inserted into the abdominal cavity.  After positive drop test,   pneumoperitoneum was obtained, the Veress needle was removed.  A 5-0 trocar   was placed.  Under laparoscopic guidance, a 5-0 trocar was placed in right   subcostal position, a 12 mm trocar was placed in the suprapubic position.  The   right lower quadrant was mobilized.  The appendix was identified and there   was evidence of purulent drainage within the right lower quadrant as well as   the appendix was perforated.  The appendix was attempted to be  lifted up but   disintegrated upon mobilization and ultimately was elevated and amputated with   an Endo-KAMRAN stapler and was then reinforced with Hemoclips.  Once that was   completed, the appendix was placed in EndoCatch, brought through the   suprapubic port.  Appendiceal bed was irrigated and hemostasis occurred.  A 10   mm drain was placed in the right lower quadrant through the suprapubic site.    Appendiceal bed was irrigated and hemostasis occurred.  Port sites were ____.   All port sites were closed with 4-0 Vicryls. The drain being secured with an   0 nylon.  Dry dressing placed on the wounds.  The patient was extubated and   taken to the recovery room in stable condition.  All sponge and needle counts   were correct.        ______________________________  TOSHA DAMIAN MD    BHARGAVI/RODRIGUE/MONA    DD:  01/15/2023 22:12  DT:  01/15/2023 23:17    Job#:  [id number]    CC:Dr. Shirley

## 2023-01-16 NOTE — ANESTHESIA PREPROCEDURE EVALUATION
Case: [id number] Date/Time: 01/15/23 2130    Procedure: APPENDECTOMY, LAPAROSCOPIC    Location: [facility] OR 10 / SURGERY [facility]    Surgeons: Heidi Dee M.D.          Relevant Problems   No relevant active problems       Physical Exam    Airway   Mallampati: II  TM distance: >3 FB  Neck ROM: full       Cardiovascular - normal exam  Rhythm: regular  Rate: normal  (-) murmur     Dental - normal exam           Pulmonary - normal exam  Breath sounds clear to auscultation     Abdominal    Neurological - normal exam                 Anesthesia Plan    ASA 1- EMERGENT   ASA physical status emergent criteria: other (comment)    Plan - general       Airway plan will be ETT          Induction: intravenous    Postoperative Plan: Postoperative administration of opioids is intended.    Pertinent diagnostic labs and testing reviewed    Informed Consent:    Anesthetic plan and risks discussed with patient.    Use of blood products discussed with: patient whom consented to blood products.       acute abdomen

## 2023-01-16 NOTE — ANESTHESIA TIME REPORT
Anesthesia Start and Stop Event Times     Date Time Event    1/15/2023 2129 Ready for Procedure     2133 Anesthesia Start     2223 Anesthesia Stop        Responsible Staff  01/15/23    Name Role Begin End    Galdino Shirley M.D. Anesth 2133 2223        Overtime Reason:  no overtime (within assigned shift)    Comments:

## 2023-01-16 NOTE — OR NURSING
Patient A+Ox4. Denies pain or nausea.  Sitting up for breakfast. VSS.  Abd soft and incision sites clean and dry. Plan to admit and awaiting bed.

## 2023-01-16 NOTE — OR NURSING
2225: Rec'd pt from OR rn and MD. Assessed pt for sedated, and nasal trumpet in place. VS stable. Assess surgical sites for 3 lap-dermabond sites intact and lower surgical site with drain sponge, medipore tape and BOLIVAR bulb suction. No drainage via surgical sites. Placed ice pack to surgical site.    2300: Pt awakens easily. Nasal trumpet removed. BP stable. Pt has low grade temp. Pt denies pain at this time.     2315: VS stable. Pt is shivering. Gave demerol ivp for shivering. Wife at bedside and updated with plan of care and pt's condition.     2330: BP elevated. Gave 5mg of lopressor for elevated bp. Pt c/o surgical pain. Will give po pain medications.     0001: BP stable. Labetalol effective.      0100: Pt tolerated walking from [facility] to inpatient bed well. Pt denies pain. VS stable. Phase 1 complete. No inpatient beds available on [facility].     0630: VS stable. Pt unable to void. Bladder scan > 350ml. Pt stated he is unable to void, but has the urge. Straight cathed pt for 325 ml of clear griselda urine. Pt able to ambulate to bathroom with no distress. Pt denies pain. Bolivar drained well. See epic flow sheet for output.     0700: Gave report to vamsi Glass. Inpatient beds pending.

## 2023-01-16 NOTE — OR NURSING
Patient A+Ox4. Denies pain or nausea. Abd soft with clean dry dressing to lower abd.  Lap stabs x2 with tegaderm closure clean and dry. BOLIVAR x1 with scant amount of bloody drainage. Tolerate breakfast 100%. Wife here and updated with patient room assignment. [id number] when clean.

## 2023-01-16 NOTE — PROGRESS NOTES
4 Eyes Skin Assessment Completed by TUTU Shelley and TUTU Morris.    Head WDL  Ears WDL  Nose WDL  Mouth WDL  Neck WDL  Breast/Chest WDL  Shoulder Blades WDL  Spine WDL  (R) Arm/Elbow/Hand WDL  (L) Arm/Elbow/Hand WDL  Abdomen Incision x2 lap sites to abd with tegaderm, dressing and jones drain to lower abd, CDI.  Groin WDL  Scrotum/Coccyx/Buttocks WDL  (R) Leg WDL  (L) Leg WDL  (R) Heel/Foot/Toe WDL  (L) Heel/Foot/Toe WDL          Devices In Places Pulse Ox and SCD's      Interventions In Place N/A    Possible Skin Injury No    Pictures Uploaded Into Epic N/A  Wound Consult Placed N/A  RN Wound Prevention Protocol Ordered No

## 2023-01-16 NOTE — CARE PLAN
The patient is Stable - Low risk of patient condition declining or worsening         Progress made toward(s) clinical / shift goals:      Report received from PACU RN.  Assessment complete.  A&O x 4. Patient calls appropriately.   Patient has 0/10 pain while laying in bed.  Patient denies SOB.  Patient is on room air at 91%.  Denies N&V. Tolerating diet.  BOLIVAR drain to lower abd, x2 lap sites to abd with tegaderm  to void by 1230, PTA BM.  SCD's on.  Review plan with of care with patient.   Call light and personal belongings with in reach.   Hourly rounding in place.   All needs met at this time.     Patient is not progressing towards the following goals:

## 2023-01-17 LAB
ANION GAP SERPL CALC-SCNC: 9 MMOL/L (ref 7–16)
BASOPHILS # BLD AUTO: 0.5 % (ref 0–1.8)
BASOPHILS # BLD: 0.06 K/UL (ref 0–0.12)
BUN SERPL-MCNC: 17 MG/DL (ref 8–22)
CALCIUM SERPL-MCNC: 8.7 MG/DL (ref 8.5–10.5)
CHLORIDE SERPL-SCNC: 102 MMOL/L (ref 96–112)
CO2 SERPL-SCNC: 27 MMOL/L (ref 20–33)
CREAT SERPL-MCNC: 1.13 MG/DL (ref 0.5–1.4)
EOSINOPHIL # BLD AUTO: 0.05 K/UL (ref 0–0.51)
EOSINOPHIL NFR BLD: 0.4 % (ref 0–6.9)
ERYTHROCYTE [DISTWIDTH] IN BLOOD BY AUTOMATED COUNT: 42.5 FL (ref 35.9–50)
GFR SERPLBLD CREATININE-BSD FMLA CKD-EPI: 70 ML/MIN/1.73 M 2
GLUCOSE SERPL-MCNC: 102 MG/DL (ref 65–99)
HCT VFR BLD AUTO: 41.8 % (ref 42–52)
HGB BLD-MCNC: 14.1 G/DL (ref 14–18)
IMM GRANULOCYTES # BLD AUTO: 0.06 K/UL (ref 0–0.11)
IMM GRANULOCYTES NFR BLD AUTO: 0.5 % (ref 0–0.9)
LYMPHOCYTES # BLD AUTO: 0.36 K/UL (ref 1–4.8)
LYMPHOCYTES NFR BLD: 3.1 % (ref 22–41)
MCH RBC QN AUTO: 30.5 PG (ref 27–33)
MCHC RBC AUTO-ENTMCNC: 33.7 G/DL (ref 33.7–35.3)
MCV RBC AUTO: 90.5 FL (ref 81.4–97.8)
MONOCYTES # BLD AUTO: 0.5 K/UL (ref 0–0.85)
MONOCYTES NFR BLD AUTO: 4.4 % (ref 0–13.4)
NEUTROPHILS # BLD AUTO: 10.4 K/UL (ref 1.82–7.42)
NEUTROPHILS NFR BLD: 91.1 % (ref 44–72)
NRBC # BLD AUTO: 0 K/UL
NRBC BLD-RTO: 0 /100 WBC
PLATELET # BLD AUTO: 170 K/UL (ref 164–446)
PMV BLD AUTO: 9.7 FL (ref 9–12.9)
POTASSIUM SERPL-SCNC: 4 MMOL/L (ref 3.6–5.5)
RBC # BLD AUTO: 4.62 M/UL (ref 4.7–6.1)
SODIUM SERPL-SCNC: 138 MMOL/L (ref 135–145)
WBC # BLD AUTO: 11.4 K/UL (ref 4.8–10.8)

## 2023-01-17 PROCEDURE — 85025 COMPLETE CBC W/AUTO DIFF WBC: CPT

## 2023-01-17 PROCEDURE — A9270 NON-COVERED ITEM OR SERVICE: HCPCS | Performed by: REGISTERED NURSE

## 2023-01-17 PROCEDURE — 700111 HCHG RX REV CODE 636 W/ 250 OVERRIDE (IP): Performed by: SURGERY

## 2023-01-17 PROCEDURE — 700102 HCHG RX REV CODE 250 W/ 637 OVERRIDE(OP): Performed by: REGISTERED NURSE

## 2023-01-17 PROCEDURE — 99024 POSTOP FOLLOW-UP VISIT: CPT | Performed by: SURGERY

## 2023-01-17 PROCEDURE — 770001 HCHG ROOM/CARE - MED/SURG/GYN PRIV*

## 2023-01-17 PROCEDURE — 36415 COLL VENOUS BLD VENIPUNCTURE: CPT

## 2023-01-17 PROCEDURE — A9270 NON-COVERED ITEM OR SERVICE: HCPCS | Performed by: SURGERY

## 2023-01-17 PROCEDURE — 700102 HCHG RX REV CODE 250 W/ 637 OVERRIDE(OP): Performed by: SURGERY

## 2023-01-17 PROCEDURE — 700105 HCHG RX REV CODE 258: Performed by: SURGERY

## 2023-01-17 PROCEDURE — 80048 BASIC METABOLIC PNL TOTAL CA: CPT

## 2023-01-17 PROCEDURE — 700101 HCHG RX REV CODE 250: Performed by: SURGERY

## 2023-01-17 RX ORDER — TAMSULOSIN HYDROCHLORIDE 0.4 MG/1
0.4 CAPSULE ORAL
Status: DISCONTINUED | OUTPATIENT
Start: 2023-01-17 | End: 2023-01-24

## 2023-01-17 RX ADMIN — TAMSULOSIN HYDROCHLORIDE 0.4 MG: 0.4 CAPSULE ORAL at 12:35

## 2023-01-17 RX ADMIN — SODIUM CHLORIDE, POTASSIUM CHLORIDE, SODIUM LACTATE AND CALCIUM CHLORIDE: 600; 310; 30; 20 INJECTION, SOLUTION INTRAVENOUS at 22:04

## 2023-01-17 RX ADMIN — MAGNESIUM HYDROXIDE 30 ML: 400 SUSPENSION ORAL at 04:20

## 2023-01-17 RX ADMIN — DOCUSATE SODIUM 100 MG: 100 CAPSULE, LIQUID FILLED ORAL at 04:20

## 2023-01-17 RX ADMIN — POLYETHYLENE GLYCOL 3350 1 PACKET: 17 POWDER, FOR SOLUTION ORAL at 04:20

## 2023-01-17 RX ADMIN — KETOROLAC TROMETHAMINE 15 MG: 30 INJECTION, SOLUTION INTRAMUSCULAR; INTRAVENOUS at 12:34

## 2023-01-17 RX ADMIN — KETOROLAC TROMETHAMINE 15 MG: 30 INJECTION, SOLUTION INTRAMUSCULAR; INTRAVENOUS at 04:20

## 2023-01-17 RX ADMIN — ONDANSETRON HYDROCHLORIDE 4 MG: 2 SOLUTION INTRAMUSCULAR; INTRAVENOUS at 16:14

## 2023-01-17 RX ADMIN — KETOROLAC TROMETHAMINE 15 MG: 30 INJECTION, SOLUTION INTRAMUSCULAR; INTRAVENOUS at 21:46

## 2023-01-17 RX ADMIN — METRONIDAZOLE 500 MG: 5 INJECTION, SOLUTION INTRAVENOUS at 12:35

## 2023-01-17 RX ADMIN — SENNOSIDES AND DOCUSATE SODIUM 1 TABLET: 50; 8.6 TABLET ORAL at 21:46

## 2023-01-17 RX ADMIN — OXYCODONE HYDROCHLORIDE 5 MG: 5 TABLET ORAL at 21:46

## 2023-01-17 RX ADMIN — METRONIDAZOLE 500 MG: 5 INJECTION, SOLUTION INTRAVENOUS at 04:22

## 2023-01-17 RX ADMIN — KETOROLAC TROMETHAMINE 15 MG: 30 INJECTION, SOLUTION INTRAMUSCULAR; INTRAVENOUS at 16:00

## 2023-01-17 RX ADMIN — METRONIDAZOLE 500 MG: 5 INJECTION, SOLUTION INTRAVENOUS at 21:48

## 2023-01-17 RX ADMIN — CEFTRIAXONE 2000 MG: 10 INJECTION, POWDER, FOR SOLUTION INTRAVENOUS at 17:41

## 2023-01-17 RX ADMIN — SODIUM CHLORIDE, POTASSIUM CHLORIDE, SODIUM LACTATE AND CALCIUM CHLORIDE: 600; 310; 30; 20 INJECTION, SOLUTION INTRAVENOUS at 08:53

## 2023-01-17 ASSESSMENT — PAIN DESCRIPTION - PAIN TYPE
TYPE: SURGICAL PAIN
TYPE: SURGICAL PAIN
TYPE: ACUTE PAIN;SURGICAL PAIN
TYPE: SURGICAL PAIN

## 2023-01-17 ASSESSMENT — PATIENT HEALTH QUESTIONNAIRE - PHQ9
1. LITTLE INTEREST OR PLEASURE IN DOING THINGS: NOT AT ALL
SUM OF ALL RESPONSES TO PHQ9 QUESTIONS 1 AND 2: 0
2. FEELING DOWN, DEPRESSED, IRRITABLE, OR HOPELESS: NOT AT ALL

## 2023-01-17 NOTE — CARE PLAN
The patient is Stable - Low risk of patient condition declining or worsening    Shift Goals  Clinical Goals: pain control, ambulate  Patient Goals: Pain control, rest    Progress made toward(s) clinical / shift goals:        Problem: Pain - Standard  Goal: Alleviation of pain or a reduction in pain to the patient’s comfort goal  Outcome: Progressing  Note: Report received from NOC RN.  Assessment complete.  A&O x 4. Patient calls appropriately.  Patient up with no assist.   Patient has 3/10 pain. Medicated.  Patient denies SOB.  Patient desats on room air, 1/2-1L NC needed.  Denies N&V. Tolerating diet.  Skin:ABD distended, x2 lap sites CDI, BOLIVAR to lower abd.  Collins catheter for urinary retetion overnight. Flomax initiated.   + flatus, + BM.  SCD's on.  Review plan with of care with patient.   Call light and personal belongings with in reach.   Hourly rounding in place.   All needs met at this time.        Patient is not progressing towards the following goals:

## 2023-01-17 NOTE — PROGRESS NOTES
"  DATE: 1/16/2023    Post Operative Day  1 laparoscopic appendectomy.    INTERVAL EVENTS:  Doing well post-operatively, pain controlled, not yet passing flatus or stool.    PHYSICAL EXAMINATION:  Vital Signs: /70   Pulse 97   Temp 36.7 °C (98.1 °F) (Temporal)   Resp 17   Ht 1.803 m (5' 11\")   Wt 80.5 kg (177 lb 7.5 oz)   SpO2 94%     Gen: No apparent distress, alert and oriented x 3.  CV: Regular rate and rhythm  Abd: Distended and tympanic, soft, appropriately tender to palpation, port-site incisions clean/dry/intact, suprapubic drain in place with 245 mL output serosanguinous fluid since surgery    LABORATORY VALUES:   Recent Labs     01/15/23  1746   WBC 21.8*   RBC 5.75   HEMOGLOBIN 17.5   HEMATOCRIT 50.8   MCV 88.3   MCH 30.4   MCHC 34.4   RDW 41.0   PLATELETCT 251   MPV 9.3     Recent Labs     01/15/23  1746   SODIUM 137   POTASSIUM 3.9   CHLORIDE 97   CO2 28   GLUCOSE 125*   BUN 13   CREATININE 0.99   CALCIUM 9.7     Recent Labs     01/15/23  1746   ASTSGOT 16   ALTSGPT 16   TBILIRUBIN 0.9   ALKPHOSPHAT 99   GLOBULIN 3.0            IMAGING:   CT-ABDOMEN-PELVIS WITH   Final Result         1.  Findings compatible with acute appendicitis      These findings were discussed with the patient's clinician, Han Hinton, on 1/15/2023 8:41 PM.          ASSESSMENT AND PLAN:   Acute appendicitis with perforation and localized peritonitis, without abscess or gangrene- (present on admission)  Assessment & Plan  2 day hx of RLQ pain, WBC 21K, CT consistent with appendicitis  Status-post laparoscopic appendectomy 1/15.  BOLIVAR drain to bulb suction.  Clear liquid diet until bowel function returns.  IV antibiotics until leukocytosis resolves.           ____________________________________     Edy Moran M.D.    DD: 1/16/2023  9:55 PM   "

## 2023-01-17 NOTE — CARE PLAN
The patient is Stable - Low risk of patient condition declining or worsening    Shift Goals  Clinical Goals: Pain control, monitor drain output  Patient Goals: Pain control, rest    Progress made toward(s) clinical / shift goals:  Pain managed per MAR and with adequate rest. BOLIVAR drain output monitored.    Problem: Knowledge Deficit - Standard  Goal: Patient and family/care givers will demonstrate understanding of plan of care, disease process/condition, diagnostic tests and medications  1/17/2023 0330 by Claire Mathews R.N.  Outcome: Progressing  1/17/2023 0330 by Claire Mathews R.N.  Outcome: Progressing     Problem: Pain - Standard  Goal: Alleviation of pain or a reduction in pain to the patient’s comfort goal  1/17/2023 0330 by Claire Mathews, R.N.  Outcome: Progressing  1/17/2023 0330 by Claire Mathews, R.N.  Outcome: Progressing       Patient is not progressing towards the following goals:

## 2023-01-17 NOTE — PROGRESS NOTES
"    Less pain  Still bloated  Tolerating clears  Flatus and bowel movement  Afebrile    /81   Pulse 94   Temp 36.2 °C (97.1 °F) (Temporal)   Resp 18   Ht 1.803 m (5' 11\")   Wt 80.5 kg (177 lb 7.5 oz)   SpO2 95%   BMI 24.75 kg/m²     Drain: 200 cc serosanguineous    Abdomen distended, firm  Nontender  Incision sites CDI    Recent Labs     01/15/23  1746 01/17/23  0302   WBC 21.8* 11.4*   RBC 5.75 4.62*   HEMOGLOBIN 17.5 14.1   HEMATOCRIT 50.8 41.8*   MCV 88.3 90.5   MCH 30.4 30.5   RDW 41.0 42.5   PLATELETCT 251 170   MPV 9.3 9.7   NEUTSPOLYS 88.70* 91.10*   LYMPHOCYTES 3.30* 3.10*   MONOCYTES 7.20 4.40   EOSINOPHILS 0.00 0.40   BASOPHILS 0.20 0.50       Recent Labs     01/15/23  1746 01/17/23  0302   SODIUM 137 138   POTASSIUM 3.9 4.0   CHLORIDE 97 102   CO2 28 27   GLUCOSE 125* 102*   BUN 13 17   CREATININE 0.99 1.13     Assessment and plan:  67-year-old male with complicated appendicitis who still has drain in place  Drain output too high for removal  Blood cultures with gram gram-negative rods and gram-positive cocci: Plan 1 week of antibiotics after source control  No abscess cultures sent so we will likely transition to Augmentin once white count is normal greater than 24 hours  Collins last night for urinary retention: Start Flomax and then trial of DC Collins later today.  If needs reinsertion will leave in place for 72 hours prior to another trial removal  Because of abdominal exam we will keep patient on clears.  Lovenox for DVT prophylaxis    "

## 2023-01-17 NOTE — PROGRESS NOTES
Received report from previous shift RN.  Assessment complete.  A&O x 4. Patient calls appropriately.  Patient ambulates with standby assist.  SpO2 >90% on 1L NC.  Patient denies SOB.  Patient has 4/10 pain. Pain managed per MAR.  Denies N&V. Tolerating clear liquid diet.  X2 lap sites to abdomen with Tegaderm, CDI. Lower abdominal BOLIVAR drain, dressing CDI.  + void via Collins catheter, - flatus, last BM PTA.  Patient pleasant and cooperative with plan of care.  Call light and personal belongings with in reach. Hourly rounding in place. All needs met at this time.

## 2023-01-18 ENCOUNTER — APPOINTMENT (OUTPATIENT)
Dept: RADIOLOGY | Facility: MEDICAL CENTER | Age: 68
DRG: 339 | End: 2023-01-18
Payer: MEDICARE

## 2023-01-18 LAB
ANION GAP SERPL CALC-SCNC: 13 MMOL/L (ref 7–16)
BASOPHILS # BLD AUTO: 0.3 % (ref 0–1.8)
BASOPHILS # BLD: 0.04 K/UL (ref 0–0.12)
BUN SERPL-MCNC: 21 MG/DL (ref 8–22)
CALCIUM SERPL-MCNC: 8.9 MG/DL (ref 8.5–10.5)
CHLORIDE SERPL-SCNC: 96 MMOL/L (ref 96–112)
CO2 SERPL-SCNC: 29 MMOL/L (ref 20–33)
CREAT SERPL-MCNC: 1.1 MG/DL (ref 0.5–1.4)
EOSINOPHIL # BLD AUTO: 0.03 K/UL (ref 0–0.51)
EOSINOPHIL NFR BLD: 0.3 % (ref 0–6.9)
ERYTHROCYTE [DISTWIDTH] IN BLOOD BY AUTOMATED COUNT: 42.7 FL (ref 35.9–50)
GFR SERPLBLD CREATININE-BSD FMLA CKD-EPI: 73 ML/MIN/1.73 M 2
GLUCOSE SERPL-MCNC: 107 MG/DL (ref 65–99)
HCT VFR BLD AUTO: 42.5 % (ref 42–52)
HGB BLD-MCNC: 14 G/DL (ref 14–18)
IMM GRANULOCYTES # BLD AUTO: 0.11 K/UL (ref 0–0.11)
IMM GRANULOCYTES NFR BLD AUTO: 0.9 % (ref 0–0.9)
LYMPHOCYTES # BLD AUTO: 0.34 K/UL (ref 1–4.8)
LYMPHOCYTES NFR BLD: 2.9 % (ref 22–41)
MCH RBC QN AUTO: 29.6 PG (ref 27–33)
MCHC RBC AUTO-ENTMCNC: 32.9 G/DL (ref 33.7–35.3)
MCV RBC AUTO: 89.9 FL (ref 81.4–97.8)
MONOCYTES # BLD AUTO: 0.63 K/UL (ref 0–0.85)
MONOCYTES NFR BLD AUTO: 5.3 % (ref 0–13.4)
NEUTROPHILS # BLD AUTO: 10.7 K/UL (ref 1.82–7.42)
NEUTROPHILS NFR BLD: 90.3 % (ref 44–72)
NRBC # BLD AUTO: 0 K/UL
NRBC BLD-RTO: 0 /100 WBC
PLATELET # BLD AUTO: 194 K/UL (ref 164–446)
PMV BLD AUTO: 9.8 FL (ref 9–12.9)
POTASSIUM SERPL-SCNC: 3.7 MMOL/L (ref 3.6–5.5)
RBC # BLD AUTO: 4.73 M/UL (ref 4.7–6.1)
SODIUM SERPL-SCNC: 138 MMOL/L (ref 135–145)
WBC # BLD AUTO: 11.9 K/UL (ref 4.8–10.8)

## 2023-01-18 PROCEDURE — A9270 NON-COVERED ITEM OR SERVICE: HCPCS | Performed by: REGISTERED NURSE

## 2023-01-18 PROCEDURE — 99024 POSTOP FOLLOW-UP VISIT: CPT

## 2023-01-18 PROCEDURE — 700102 HCHG RX REV CODE 250 W/ 637 OVERRIDE(OP): Performed by: REGISTERED NURSE

## 2023-01-18 PROCEDURE — 74018 RADEX ABDOMEN 1 VIEW: CPT

## 2023-01-18 PROCEDURE — 700111 HCHG RX REV CODE 636 W/ 250 OVERRIDE (IP): Performed by: SURGERY

## 2023-01-18 PROCEDURE — 80048 BASIC METABOLIC PNL TOTAL CA: CPT

## 2023-01-18 PROCEDURE — A9270 NON-COVERED ITEM OR SERVICE: HCPCS | Performed by: SURGERY

## 2023-01-18 PROCEDURE — 36415 COLL VENOUS BLD VENIPUNCTURE: CPT

## 2023-01-18 PROCEDURE — 700101 HCHG RX REV CODE 250: Performed by: SURGERY

## 2023-01-18 PROCEDURE — 85025 COMPLETE CBC W/AUTO DIFF WBC: CPT

## 2023-01-18 PROCEDURE — 700105 HCHG RX REV CODE 258: Performed by: SURGERY

## 2023-01-18 PROCEDURE — 700102 HCHG RX REV CODE 250 W/ 637 OVERRIDE(OP): Performed by: SURGERY

## 2023-01-18 PROCEDURE — 51798 US URINE CAPACITY MEASURE: CPT

## 2023-01-18 PROCEDURE — 770001 HCHG ROOM/CARE - MED/SURG/GYN PRIV*

## 2023-01-18 RX ADMIN — KETOROLAC TROMETHAMINE 15 MG: 30 INJECTION, SOLUTION INTRAMUSCULAR; INTRAVENOUS at 20:38

## 2023-01-18 RX ADMIN — CEFTRIAXONE 2000 MG: 10 INJECTION, POWDER, FOR SOLUTION INTRAVENOUS at 17:14

## 2023-01-18 RX ADMIN — SODIUM CHLORIDE, POTASSIUM CHLORIDE, SODIUM LACTATE AND CALCIUM CHLORIDE: 600; 310; 30; 20 INJECTION, SOLUTION INTRAVENOUS at 10:27

## 2023-01-18 RX ADMIN — METRONIDAZOLE 500 MG: 5 INJECTION, SOLUTION INTRAVENOUS at 14:08

## 2023-01-18 RX ADMIN — KETOROLAC TROMETHAMINE 15 MG: 30 INJECTION, SOLUTION INTRAMUSCULAR; INTRAVENOUS at 05:14

## 2023-01-18 RX ADMIN — TAMSULOSIN HYDROCHLORIDE 0.4 MG: 0.4 CAPSULE ORAL at 10:27

## 2023-01-18 RX ADMIN — KETOROLAC TROMETHAMINE 15 MG: 30 INJECTION, SOLUTION INTRAMUSCULAR; INTRAVENOUS at 10:26

## 2023-01-18 RX ADMIN — METRONIDAZOLE 500 MG: 5 INJECTION, SOLUTION INTRAVENOUS at 20:36

## 2023-01-18 RX ADMIN — SODIUM CHLORIDE, POTASSIUM CHLORIDE, SODIUM LACTATE AND CALCIUM CHLORIDE: 600; 310; 30; 20 INJECTION, SOLUTION INTRAVENOUS at 20:34

## 2023-01-18 RX ADMIN — METRONIDAZOLE 500 MG: 5 INJECTION, SOLUTION INTRAVENOUS at 05:15

## 2023-01-18 RX ADMIN — KETOROLAC TROMETHAMINE 15 MG: 30 INJECTION, SOLUTION INTRAMUSCULAR; INTRAVENOUS at 17:14

## 2023-01-18 RX ADMIN — ONDANSETRON HYDROCHLORIDE 4 MG: 2 SOLUTION INTRAMUSCULAR; INTRAVENOUS at 05:14

## 2023-01-18 RX ADMIN — SENNOSIDES AND DOCUSATE SODIUM 1 TABLET: 50; 8.6 TABLET ORAL at 20:38

## 2023-01-18 ASSESSMENT — PAIN DESCRIPTION - PAIN TYPE
TYPE: ACUTE PAIN;SURGICAL PAIN
TYPE: ACUTE PAIN
TYPE: ACUTE PAIN
TYPE: ACUTE PAIN;SURGICAL PAIN

## 2023-01-18 ASSESSMENT — ENCOUNTER SYMPTOMS
DIAPHORESIS: 0
VOMITING: 1
CHILLS: 0
NAUSEA: 1
CARDIOVASCULAR NEGATIVE: 1
ABDOMINAL PAIN: 0
FEVER: 0
PSYCHIATRIC NEGATIVE: 1
DIARRHEA: 0
NEUROLOGICAL NEGATIVE: 1
SHORTNESS OF BREATH: 0
MYALGIAS: 0
EYES NEGATIVE: 1

## 2023-01-18 NOTE — PROGRESS NOTES
"    DATE: 1/18/2023    Post Operative Day  3  laparoscopic appendectomy .    INTERVAL EVENTS:  Bloated. Abdomen distended.  Vomited 400 cc this a.m.  +Flatus and watery stools.  BOLIVAR with minimal drainage, 65 cc.  Voiding post catheter removal.    - KUB with ileus.  - NPO, sips with medications.  - Ambulate  - If patient continues to vomit, please place NG to low continuous suction.    REVIEW OF SYSTEMS:  Review of Systems   Constitutional:  Negative for chills, diaphoresis, fever and malaise/fatigue.   HENT: Negative.     Eyes: Negative.    Respiratory:  Negative for shortness of breath.    Cardiovascular: Negative.    Gastrointestinal:  Positive for nausea and vomiting. Negative for abdominal pain and diarrhea.   Genitourinary:  Negative for dysuria.        Voiding   Musculoskeletal:  Negative for myalgias.   Skin: Negative.    Neurological: Negative.    Endo/Heme/Allergies: Negative.    Psychiatric/Behavioral: Negative.     All other systems reviewed and are negative.    PHYSICAL EXAMINATION:  Vital Signs: /89   Pulse 82   Temp 37.5 °C (99.5 °F) (Temporal)   Resp 16   Ht 1.803 m (5' 11\")   Wt 80.5 kg (177 lb 7.5 oz)   SpO2 94%   Physical Exam  Vitals and nursing note reviewed.   Constitutional:       General: He is not in acute distress.     Appearance: He is not ill-appearing.   Cardiovascular:      Rate and Rhythm: Normal rate.   Pulmonary:      Effort: Pulmonary effort is normal.   Abdominal:      General: There is distension.      Palpations: Abdomen is soft.      Tenderness: There is no abdominal tenderness. There is no guarding.   Neurological:      Mental Status: He is alert and oriented to person, place, and time.   Psychiatric:         Mood and Affect: Mood normal.       LABORATORY VALUES:   Recent Labs     01/15/23  1746 01/17/23  0302 01/18/23  0111   WBC 21.8* 11.4* 11.9*   RBC 5.75 4.62* 4.73   HEMOGLOBIN 17.5 14.1 14.0   HEMATOCRIT 50.8 41.8* 42.5   MCV 88.3 90.5 89.9   MCH 30.4 30.5 29.6 "   MCHC 34.4 33.7 32.9*   RDW 41.0 42.5 42.7   PLATELETCT 251 170 194   MPV 9.3 9.7 9.8     Recent Labs     01/15/23  1746 01/17/23  0302 01/18/23  0111   SODIUM 137 138 138   POTASSIUM 3.9 4.0 3.7   CHLORIDE 97 102 96   CO2 28 27 29   GLUCOSE 125* 102* 107*   BUN 13 17 21   CREATININE 0.99 1.13 1.10   CALCIUM 9.7 8.7 8.9     Recent Labs     01/15/23  1746   ASTSGOT 16   ALTSGPT 16   TBILIRUBIN 0.9   ALKPHOSPHAT 99   GLOBULIN 3.0            IMAGING:   [id number] VIEW   Final Result      1.  Air-filled loops of small bowel in the mid and lower abdomen minimally dilated proximally but normal in caliber distally. This is likely postoperative ileus.   2.  There are postoperative changes in the right lower quadrant.      CT-ABDOMEN-PELVIS WITH   Final Result         1.  Findings compatible with acute appendicitis      These findings were discussed with the patient's clinician, Han Hinton, on 1/15/2023 8:41 PM.          ASSESSMENT AND PLAN:   Acute appendicitis with perforation and localized peritonitis, without abscess or gangrene- (present on admission)  Assessment & Plan  2 day hx of RLQ pain, WBC 21K, CT consistent with appendicitis  Status-post laparoscopic appendectomy 1/15.  BOLIVAR drain to bulb suction.  Clear liquid diet until bowel function returns.  IV antibiotics until leukocytosis resolves.  1/18 Vomiting, abdominal distention.  - KUB with ileus. NPO sips with meds.        Discussed patient condition with Family, RN, Patient, and general surgery, Dr. Ayala.

## 2023-01-18 NOTE — PROGRESS NOTES
Pt is AxOx4; on room air.  Verbalizes acute surgical pain. PRN pain meds in use per MAR.  Skin per flowsheets.  Denies SOB/chest pain/numbness or tingling  + void. LBM PTA 1/17.  Lap sites and RLQ BOLIVAR drain.  Denies N/V. Tolerating clear liquid diet.  SCDs refused. Pt ambulates independently.  VTE prophylaxis not ordered at this time.

## 2023-01-18 NOTE — PROGRESS NOTES
"Pt notified RN that he had an episode of emesis aarond 2:30 am. He had output of 400cc, yellow thick liquid.  Pt stated, \"I felt much better after that.\"  "

## 2023-01-18 NOTE — CARE PLAN
The patient is Watcher - Medium risk of patient condition declining or worsening    Shift Goals  Clinical Goals: pain control, N/V control  Patient Goals: pain control; ambulate; sleep    Progress made toward(s) clinical / shift goals:        Problem: Pain - Standard  Goal: Alleviation of pain or a reduction in pain to the patient’s comfort goal  Note: Report received from NOC RN.  Assessment complete.  A&O x 4. Patient calls appropriately.  Patient up with SB assist.   Pain ok at this time.  Patient denies SOB.  Patient is on 1L NC while sleeping.  1 episode of emesis overnight ~400ml, feeling better this AM,  Skin:ABD distended, BOLIVAR drain output slowing down 25ml overnight.  + void, Bladder scan 335 this AM, + flatus,watery BMs last night.  Review plan with of care with patient.   Call light and personal belongings with in reach.   Hourly rounding in place.   All needs met at this time.        Patient is not progressing towards the following goals:

## 2023-01-19 LAB
ANION GAP SERPL CALC-SCNC: 12 MMOL/L (ref 7–16)
BASOPHILS # BLD AUTO: 0.6 % (ref 0–1.8)
BASOPHILS # BLD: 0.06 K/UL (ref 0–0.12)
BUN SERPL-MCNC: 26 MG/DL (ref 8–22)
CALCIUM SERPL-MCNC: 8.4 MG/DL (ref 8.5–10.5)
CHLORIDE SERPL-SCNC: 100 MMOL/L (ref 96–112)
CO2 SERPL-SCNC: 26 MMOL/L (ref 20–33)
CREAT SERPL-MCNC: 1.01 MG/DL (ref 0.5–1.4)
EOSINOPHIL # BLD AUTO: 0.21 K/UL (ref 0–0.51)
EOSINOPHIL NFR BLD: 2 % (ref 0–6.9)
ERYTHROCYTE [DISTWIDTH] IN BLOOD BY AUTOMATED COUNT: 43.2 FL (ref 35.9–50)
GFR SERPLBLD CREATININE-BSD FMLA CKD-EPI: 81 ML/MIN/1.73 M 2
GLUCOSE SERPL-MCNC: 93 MG/DL (ref 65–99)
HCT VFR BLD AUTO: 37.1 % (ref 42–52)
HGB BLD-MCNC: 12.5 G/DL (ref 14–18)
IMM GRANULOCYTES # BLD AUTO: 0.09 K/UL (ref 0–0.11)
IMM GRANULOCYTES NFR BLD AUTO: 0.8 % (ref 0–0.9)
LYMPHOCYTES # BLD AUTO: 0.73 K/UL (ref 1–4.8)
LYMPHOCYTES NFR BLD: 6.9 % (ref 22–41)
MCH RBC QN AUTO: 30.1 PG (ref 27–33)
MCHC RBC AUTO-ENTMCNC: 33.7 G/DL (ref 33.7–35.3)
MCV RBC AUTO: 89.4 FL (ref 81.4–97.8)
MONOCYTES # BLD AUTO: 0.83 K/UL (ref 0–0.85)
MONOCYTES NFR BLD AUTO: 7.8 % (ref 0–13.4)
NEUTROPHILS # BLD AUTO: 8.73 K/UL (ref 1.82–7.42)
NEUTROPHILS NFR BLD: 81.9 % (ref 44–72)
NRBC # BLD AUTO: 0 K/UL
NRBC BLD-RTO: 0 /100 WBC
PLATELET # BLD AUTO: 202 K/UL (ref 164–446)
PMV BLD AUTO: 9.7 FL (ref 9–12.9)
POTASSIUM SERPL-SCNC: 3.5 MMOL/L (ref 3.6–5.5)
RBC # BLD AUTO: 4.15 M/UL (ref 4.7–6.1)
SODIUM SERPL-SCNC: 138 MMOL/L (ref 135–145)
WBC # BLD AUTO: 10.7 K/UL (ref 4.8–10.8)

## 2023-01-19 PROCEDURE — 700102 HCHG RX REV CODE 250 W/ 637 OVERRIDE(OP): Performed by: REGISTERED NURSE

## 2023-01-19 PROCEDURE — A9270 NON-COVERED ITEM OR SERVICE: HCPCS

## 2023-01-19 PROCEDURE — A9270 NON-COVERED ITEM OR SERVICE: HCPCS | Performed by: SURGERY

## 2023-01-19 PROCEDURE — 99024 POSTOP FOLLOW-UP VISIT: CPT

## 2023-01-19 PROCEDURE — 85025 COMPLETE CBC W/AUTO DIFF WBC: CPT

## 2023-01-19 PROCEDURE — 770001 HCHG ROOM/CARE - MED/SURG/GYN PRIV*

## 2023-01-19 PROCEDURE — 700102 HCHG RX REV CODE 250 W/ 637 OVERRIDE(OP): Performed by: SURGERY

## 2023-01-19 PROCEDURE — 700102 HCHG RX REV CODE 250 W/ 637 OVERRIDE(OP)

## 2023-01-19 PROCEDURE — 36415 COLL VENOUS BLD VENIPUNCTURE: CPT

## 2023-01-19 PROCEDURE — A9270 NON-COVERED ITEM OR SERVICE: HCPCS | Performed by: REGISTERED NURSE

## 2023-01-19 PROCEDURE — 700105 HCHG RX REV CODE 258: Performed by: SURGERY

## 2023-01-19 PROCEDURE — 80048 BASIC METABOLIC PNL TOTAL CA: CPT

## 2023-01-19 PROCEDURE — 700101 HCHG RX REV CODE 250: Performed by: SURGERY

## 2023-01-19 PROCEDURE — 99024 POSTOP FOLLOW-UP VISIT: CPT | Performed by: SURGERY

## 2023-01-19 RX ORDER — LEVOFLOXACIN 500 MG/1
750 TABLET, FILM COATED ORAL DAILY
Status: DISCONTINUED | OUTPATIENT
Start: 2023-01-19 | End: 2023-01-21

## 2023-01-19 RX ORDER — AMOXICILLIN AND CLAVULANATE POTASSIUM 875; 125 MG/1; MG/1
1 TABLET, FILM COATED ORAL EVERY 12 HOURS
Status: DISCONTINUED | OUTPATIENT
Start: 2023-01-19 | End: 2023-01-19

## 2023-01-19 RX ORDER — METRONIDAZOLE 500 MG/1
500 TABLET ORAL EVERY 8 HOURS
Status: DISCONTINUED | OUTPATIENT
Start: 2023-01-19 | End: 2023-01-21

## 2023-01-19 RX ORDER — TIZANIDINE 4 MG/1
4 TABLET ORAL EVERY 6 HOURS PRN
Status: DISCONTINUED | OUTPATIENT
Start: 2023-01-19 | End: 2023-01-25 | Stop reason: HOSPADM

## 2023-01-19 RX ADMIN — POLYETHYLENE GLYCOL 3350 1 PACKET: 17 POWDER, FOR SOLUTION ORAL at 16:00

## 2023-01-19 RX ADMIN — METRONIDAZOLE 500 MG: 5 INJECTION, SOLUTION INTRAVENOUS at 11:45

## 2023-01-19 RX ADMIN — DOCUSATE SODIUM 100 MG: 100 CAPSULE, LIQUID FILLED ORAL at 16:00

## 2023-01-19 RX ADMIN — DOCUSATE SODIUM 100 MG: 100 CAPSULE, LIQUID FILLED ORAL at 05:45

## 2023-01-19 RX ADMIN — SENNOSIDES AND DOCUSATE SODIUM 1 TABLET: 50; 8.6 TABLET ORAL at 23:10

## 2023-01-19 RX ADMIN — TAMSULOSIN HYDROCHLORIDE 0.4 MG: 0.4 CAPSULE ORAL at 08:12

## 2023-01-19 RX ADMIN — METRONIDAZOLE 500 MG: 500 TABLET ORAL at 23:10

## 2023-01-19 RX ADMIN — LEVOFLOXACIN 750 MG: 500 TABLET, FILM COATED ORAL at 16:00

## 2023-01-19 RX ADMIN — METRONIDAZOLE 500 MG: 5 INJECTION, SOLUTION INTRAVENOUS at 05:44

## 2023-01-19 RX ADMIN — TIZANIDINE 4 MG: 4 TABLET ORAL at 23:10

## 2023-01-19 RX ADMIN — TIZANIDINE 4 MG: 4 TABLET ORAL at 16:00

## 2023-01-19 RX ADMIN — SODIUM CHLORIDE, POTASSIUM CHLORIDE, SODIUM LACTATE AND CALCIUM CHLORIDE: 600; 310; 30; 20 INJECTION, SOLUTION INTRAVENOUS at 20:23

## 2023-01-19 ASSESSMENT — ENCOUNTER SYMPTOMS
FEVER: 0
SHORTNESS OF BREATH: 0
NAUSEA: 0
VOMITING: 0
EYES NEGATIVE: 1
DIAPHORESIS: 0
CARDIOVASCULAR NEGATIVE: 1
DIARRHEA: 0
PSYCHIATRIC NEGATIVE: 1
MYALGIAS: 0
CHILLS: 0
ABDOMINAL PAIN: 0
NEUROLOGICAL NEGATIVE: 1

## 2023-01-19 ASSESSMENT — PAIN DESCRIPTION - PAIN TYPE
TYPE: ACUTE PAIN

## 2023-01-19 NOTE — PROGRESS NOTES
Assumed care of patient at 0645. Bedside report received. Assessment complete.     A&O x 4, pt using call light appropriately  Mobility: Up independently,  no assistive devices needed   Fall Risk Assessment: Low fall risk per davis tre score, bed alarm: n/a,  Precautions in place per flowsheets  Pain: patient reports pain well controlled. Educated patient on pain rating scale, prn medications for pain management.   Diet: NPO, - emesis, improving nausea  LDA:   IV Access: 20G right FA, infusing per MAR  Wounds: surgical sites noted, intact  GI/: + void, + flatus, + watery stool. Abdomen distended  DVT Prophylaxis: SCD declined, education provided regarding purpose and importance  Sedrick Score: 20 Minimal risk for skin breakdown, Interventions per flow sheet    Plan of care discussed. Educated regarding importance of oral care. Oral care kit in patient room. Incentive Spirometer at bedside. Educated patient regarding purpose and importance, patient demonstrated proper use. Encouraged use 10x/hour while awake.     All questions answered at this time. Call light is within reach, treaded slipper socks on, bed in lowest/ locked position, hourly rounding in place, all needs met at this time.     Writer called and spoke with patient.     Discussed that due to COVID-19 restrictions, unable to offer sooner appointment with Dr. Nogueira, as trying to minimize patient visits in clinic. Discussed that actually may need to postpone consultation appointment further due to COVID-19 restrictions.     Patient states he is \"very scared\" due to worsening of symptoms over past month or so. He states that he had cervical laminectomy with fusion on 7/23/2019 at [facility] in [place], [place].     Dr. Leticia Geller MD   Office [phone]  Cell [phone]  Fax [phone]    Since his discharge from the hospital in 7/2019, he's experiences right arm and finger numbness. He states \"we were hoping it was just the nerves healing.\" Noted that there is some documentation in Epic from hospital system in [place]. Patient states that he moved to [place] to live with his daughter following his cervical surgery. He followed with a pain clinic there for a time, also had therapy.     He states he now has been experiencing bilateral arm and finger numbness since about one month ago. He denies weakness or poor hand , but states \"the numbness definitely hinders me.\" He states he thinks he \"maybe has carpal tunnel.\" He notes he had an EMG \"a long long time ago,\" prior to cervical surgery last summer. He states his neck is \"OK\" but \"a little sore.\" He states that he was hoping to follow-up with surgeon in [place], but had transportation limitations, also now unable to travel due to COVID-19 restrictions.     He states he recently saw podiatrist (noted history of diabetes). He states that due to patient complaints of the bottom of bilateral feet getting numb, podiatrist suggested patient be seen by neurosurgery. Patient states he has imaging on a disc, most recent images are from time around surgery (approximately 7/2019-8/2019) from [facility]. He denies more recent imaging.     Discussed that writer will  reach out to patient later this week or early next week regarding upcoming consultation appointment options. Patient verbalized understanding of all discussed. No further questions at this time.

## 2023-01-19 NOTE — PROGRESS NOTES
Pt is AxOx4; on room air.  Verbalizes acute surgical pain. PRN pain meds in use per MAR.  Skin per flowsheets.  Denies SOB/chest pain/numbness or tingling  + void. LBM PTA 1/18.  Lap sites and lower mid abd BOLIVAR drain.  Denies N/V. KUB showed +ileus; pt now NPO with sips with meds.  SCDs refused. Pt ambulates independently.  VTE prophylaxis not ordered at this time.

## 2023-01-19 NOTE — PROGRESS NOTES
"    DATE: 1/19/2023    Post Operative Day  4  laparoscopic appendectomy .    INTERVAL EVENTS:  Remains distended. Minimal abdominal pain.  Denies vomiting. +flatus and watery stool.  BOLIVAR with 105 cc drainage.  Leukocytosis resolved.    - Continue NPO, sips with medications.  - Ambulate.  - Consider NGT, if abdomen continues to be distended and patient vomiting.    REVIEW OF SYSTEMS:  Review of Systems   Constitutional:  Positive for malaise/fatigue. Negative for chills, diaphoresis and fever.   HENT: Negative.     Eyes: Negative.    Respiratory:  Negative for shortness of breath.    Cardiovascular: Negative.    Gastrointestinal:  Negative for abdominal pain, diarrhea, nausea and vomiting.        +flatus and watery stools   Genitourinary:  Negative for dysuria.        Voiding   Musculoskeletal:  Negative for myalgias.   Skin: Negative.    Neurological: Negative.    Endo/Heme/Allergies: Negative.    Psychiatric/Behavioral: Negative.     All other systems reviewed and are negative.    PHYSICAL EXAMINATION:  Vital Signs: BP (!) 158/98   Pulse 91   Temp 37.6 °C (99.7 °F) (Temporal)   Resp 17   Ht 1.803 m (5' 11\")   Wt 80.5 kg (177 lb 7.5 oz)   SpO2 94%   Physical Exam  Vitals and nursing note reviewed.   Constitutional:       General: He is not in acute distress.     Appearance: He is not ill-appearing.   Cardiovascular:      Rate and Rhythm: Normal rate.   Pulmonary:      Effort: Pulmonary effort is normal.   Abdominal:      General: There is distension.      Palpations: Abdomen is soft.      Tenderness: There is no abdominal tenderness. There is no guarding.   Musculoskeletal:         General: Normal range of motion.   Neurological:      Mental Status: He is alert and oriented to person, place, and time.   Psychiatric:         Mood and Affect: Mood normal.       LABORATORY VALUES:   Recent Labs     01/17/23  0302 01/18/23  0111 01/19/23  0056   WBC 11.4* 11.9* 10.7   RBC 4.62* 4.73 4.15*   HEMOGLOBIN 14.1 14.0 " 12.5*   HEMATOCRIT 41.8* 42.5 37.1*   MCV 90.5 89.9 89.4   MCH 30.5 29.6 30.1   MCHC 33.7 32.9* 33.7   RDW 42.5 42.7 43.2   PLATELETCT 170 194 202   MPV 9.7 9.8 9.7     Recent Labs     01/17/23  0302 01/18/23  0111 01/19/23  0056   SODIUM 138 138 138   POTASSIUM 4.0 3.7 3.5*   CHLORIDE 102 96 100   CO2 27 29 26   GLUCOSE 102* 107* 93   BUN 17 21 26*   CREATININE 1.13 1.10 1.01   CALCIUM 8.7 8.9 8.4*                IMAGING:   [id number] VIEW   Final Result      1.  Air-filled loops of small bowel in the mid and lower abdomen minimally dilated proximally but normal in caliber distally. This is likely postoperative ileus.   2.  There are postoperative changes in the right lower quadrant.      CT-ABDOMEN-PELVIS WITH   Final Result         1.  Findings compatible with acute appendicitis      These findings were discussed with the patient's clinician, Han Hinton, on 1/15/2023 8:41 PM.              ASSESSMENT AND PLAN:   Acute appendicitis with perforation and localized peritonitis, without abscess or gangrene- (present on admission)  Assessment & Plan  2 day hx of RLQ pain, WBC 21K, CT consistent with appendicitis  Status-post laparoscopic appendectomy 1/15.  BOLIVAR drain to bulb suction.  Clear liquid diet until bowel function returns.  IV antibiotics until leukocytosis resolves.  1/18 Vomiting, abdominal distention.  - KUB with ileus. NPO sips with meds.        Discussed patient condition with RN, Patient, and general surgery, Dr. Ayala.

## 2023-01-20 LAB
ANION GAP SERPL CALC-SCNC: 12 MMOL/L (ref 7–16)
BACTERIA BLD CULT: ABNORMAL
BACTERIA BLD CULT: NORMAL
BASOPHILS # BLD AUTO: 0.6 % (ref 0–1.8)
BASOPHILS # BLD AUTO: 0.9 % (ref 0–1.8)
BASOPHILS # BLD: 0.07 K/UL (ref 0–0.12)
BASOPHILS # BLD: 0.1 K/UL (ref 0–0.12)
BUN SERPL-MCNC: 19 MG/DL (ref 8–22)
CALCIUM SERPL-MCNC: 8.2 MG/DL (ref 8.5–10.5)
CHLORIDE SERPL-SCNC: 101 MMOL/L (ref 96–112)
CO2 SERPL-SCNC: 24 MMOL/L (ref 20–33)
CREAT SERPL-MCNC: 0.76 MG/DL (ref 0.5–1.4)
EOSINOPHIL # BLD AUTO: 0.22 K/UL (ref 0–0.51)
EOSINOPHIL # BLD AUTO: 0.34 K/UL (ref 0–0.51)
EOSINOPHIL NFR BLD: 2 % (ref 0–6.9)
EOSINOPHIL NFR BLD: 3.1 % (ref 0–6.9)
ERYTHROCYTE [DISTWIDTH] IN BLOOD BY AUTOMATED COUNT: 43.8 FL (ref 35.9–50)
ERYTHROCYTE [DISTWIDTH] IN BLOOD BY AUTOMATED COUNT: 43.9 FL (ref 35.9–50)
GFR SERPLBLD CREATININE-BSD FMLA CKD-EPI: 97 ML/MIN/1.73 M 2
GLUCOSE SERPL-MCNC: 97 MG/DL (ref 65–99)
HCT VFR BLD AUTO: 37 % (ref 42–52)
HCT VFR BLD AUTO: 38.7 % (ref 42–52)
HGB BLD-MCNC: 12.5 G/DL (ref 14–18)
HGB BLD-MCNC: 13 G/DL (ref 14–18)
IMM GRANULOCYTES # BLD AUTO: 0.23 K/UL (ref 0–0.11)
IMM GRANULOCYTES # BLD AUTO: 0.45 K/UL (ref 0–0.11)
IMM GRANULOCYTES NFR BLD AUTO: 2.1 % (ref 0–0.9)
IMM GRANULOCYTES NFR BLD AUTO: 4.1 % (ref 0–0.9)
LYMPHOCYTES # BLD AUTO: 0.72 K/UL (ref 1–4.8)
LYMPHOCYTES # BLD AUTO: 0.75 K/UL (ref 1–4.8)
LYMPHOCYTES NFR BLD: 6.5 % (ref 22–41)
LYMPHOCYTES NFR BLD: 6.8 % (ref 22–41)
MCH RBC QN AUTO: 30 PG (ref 27–33)
MCH RBC QN AUTO: 30.2 PG (ref 27–33)
MCHC RBC AUTO-ENTMCNC: 33.6 G/DL (ref 33.7–35.3)
MCHC RBC AUTO-ENTMCNC: 33.8 G/DL (ref 33.7–35.3)
MCV RBC AUTO: 88.9 FL (ref 81.4–97.8)
MCV RBC AUTO: 90 FL (ref 81.4–97.8)
MONOCYTES # BLD AUTO: 1.1 K/UL (ref 0–0.85)
MONOCYTES # BLD AUTO: 1.2 K/UL (ref 0–0.85)
MONOCYTES NFR BLD AUTO: 10.8 % (ref 0–13.4)
MONOCYTES NFR BLD AUTO: 9.9 % (ref 0–13.4)
NEUTROPHILS # BLD AUTO: 8.47 K/UL (ref 1.82–7.42)
NEUTROPHILS # BLD AUTO: 8.58 K/UL (ref 1.82–7.42)
NEUTROPHILS NFR BLD: 76.3 % (ref 44–72)
NEUTROPHILS NFR BLD: 76.9 % (ref 44–72)
NRBC # BLD AUTO: 0 K/UL
NRBC # BLD AUTO: 0 K/UL
NRBC BLD-RTO: 0 /100 WBC
NRBC BLD-RTO: 0 /100 WBC
PLATELET # BLD AUTO: 225 K/UL (ref 164–446)
PLATELET # BLD AUTO: 272 K/UL (ref 164–446)
PMV BLD AUTO: 9.1 FL (ref 9–12.9)
PMV BLD AUTO: 9.4 FL (ref 9–12.9)
POTASSIUM SERPL-SCNC: 3.5 MMOL/L (ref 3.6–5.5)
RBC # BLD AUTO: 4.16 M/UL (ref 4.7–6.1)
RBC # BLD AUTO: 4.3 M/UL (ref 4.7–6.1)
SIGNIFICANT IND 70042: ABNORMAL
SIGNIFICANT IND 70042: NORMAL
SITE SITE: ABNORMAL
SITE SITE: NORMAL
SODIUM SERPL-SCNC: 137 MMOL/L (ref 135–145)
SOURCE SOURCE: ABNORMAL
SOURCE SOURCE: NORMAL
WBC # BLD AUTO: 11.1 K/UL (ref 4.8–10.8)
WBC # BLD AUTO: 11.1 K/UL (ref 4.8–10.8)

## 2023-01-20 PROCEDURE — 99024 POSTOP FOLLOW-UP VISIT: CPT

## 2023-01-20 PROCEDURE — 770001 HCHG ROOM/CARE - MED/SURG/GYN PRIV*

## 2023-01-20 PROCEDURE — 700102 HCHG RX REV CODE 250 W/ 637 OVERRIDE(OP): Performed by: SURGERY

## 2023-01-20 PROCEDURE — A9270 NON-COVERED ITEM OR SERVICE: HCPCS | Performed by: REGISTERED NURSE

## 2023-01-20 PROCEDURE — 700105 HCHG RX REV CODE 258: Performed by: SURGERY

## 2023-01-20 PROCEDURE — 700102 HCHG RX REV CODE 250 W/ 637 OVERRIDE(OP): Performed by: REGISTERED NURSE

## 2023-01-20 PROCEDURE — A9270 NON-COVERED ITEM OR SERVICE: HCPCS

## 2023-01-20 PROCEDURE — 700102 HCHG RX REV CODE 250 W/ 637 OVERRIDE(OP)

## 2023-01-20 PROCEDURE — 80048 BASIC METABOLIC PNL TOTAL CA: CPT

## 2023-01-20 PROCEDURE — 85025 COMPLETE CBC W/AUTO DIFF WBC: CPT | Mod: 91

## 2023-01-20 PROCEDURE — 36415 COLL VENOUS BLD VENIPUNCTURE: CPT

## 2023-01-20 PROCEDURE — A9270 NON-COVERED ITEM OR SERVICE: HCPCS | Performed by: SURGERY

## 2023-01-20 RX ORDER — LISINOPRIL 5 MG/1
5 TABLET ORAL
Status: DISCONTINUED | OUTPATIENT
Start: 2023-01-20 | End: 2023-01-21

## 2023-01-20 RX ADMIN — METRONIDAZOLE 500 MG: 500 TABLET ORAL at 06:37

## 2023-01-20 RX ADMIN — DOCUSATE SODIUM 100 MG: 100 CAPSULE, LIQUID FILLED ORAL at 16:50

## 2023-01-20 RX ADMIN — SODIUM CHLORIDE, POTASSIUM CHLORIDE, SODIUM LACTATE AND CALCIUM CHLORIDE: 600; 310; 30; 20 INJECTION, SOLUTION INTRAVENOUS at 03:46

## 2023-01-20 RX ADMIN — TIZANIDINE 4 MG: 4 TABLET ORAL at 23:30

## 2023-01-20 RX ADMIN — POLYETHYLENE GLYCOL 3350 1 PACKET: 17 POWDER, FOR SOLUTION ORAL at 16:50

## 2023-01-20 RX ADMIN — DOCUSATE SODIUM 100 MG: 100 CAPSULE, LIQUID FILLED ORAL at 06:38

## 2023-01-20 RX ADMIN — METRONIDAZOLE 500 MG: 500 TABLET ORAL at 14:19

## 2023-01-20 RX ADMIN — LISINOPRIL 5 MG: 5 TABLET ORAL at 12:43

## 2023-01-20 RX ADMIN — LEVOFLOXACIN 750 MG: 500 TABLET, FILM COATED ORAL at 06:38

## 2023-01-20 RX ADMIN — METRONIDAZOLE 500 MG: 500 TABLET ORAL at 21:45

## 2023-01-20 RX ADMIN — TAMSULOSIN HYDROCHLORIDE 0.4 MG: 0.4 CAPSULE ORAL at 08:53

## 2023-01-20 RX ADMIN — SODIUM CHLORIDE, POTASSIUM CHLORIDE, SODIUM LACTATE AND CALCIUM CHLORIDE: 600; 310; 30; 20 INJECTION, SOLUTION INTRAVENOUS at 14:19

## 2023-01-20 RX ADMIN — POLYETHYLENE GLYCOL 3350 1 PACKET: 17 POWDER, FOR SOLUTION ORAL at 06:42

## 2023-01-20 RX ADMIN — SENNOSIDES AND DOCUSATE SODIUM 1 TABLET: 50; 8.6 TABLET ORAL at 21:45

## 2023-01-20 ASSESSMENT — ENCOUNTER SYMPTOMS
DIARRHEA: 0
PSYCHIATRIC NEGATIVE: 1
MYALGIAS: 0
SHORTNESS OF BREATH: 0
VOMITING: 0
CHILLS: 0
CARDIOVASCULAR NEGATIVE: 1
DIAPHORESIS: 0
NEUROLOGICAL NEGATIVE: 1
EYES NEGATIVE: 1
NAUSEA: 0
ABDOMINAL PAIN: 0
FEVER: 0
ROS GI COMMENTS: LAST BM 1/20

## 2023-01-20 ASSESSMENT — PAIN DESCRIPTION - PAIN TYPE
TYPE: ACUTE PAIN
TYPE: ACUTE PAIN

## 2023-01-20 NOTE — DIETARY
Nutrition Services Brief Update:    Day 5 of admit.  lCinton Garcia is a 67 y.o. male with admitting DX of Perforated appendicitis, Perforated appendix.     Current Diet: Clear liquids, just advanced today from NPO status. Pt NPO/clears since admit (~ 5 days).   Per surgery notes: Post Operative Day 4 laparoscopic appendectomy and less distended today with large BM this am.   Pertinent Labs: K+ 3.5.     Problem: Nutritional:  Goal: Achieve adequate nutritional intake  Description: Diet advancement to Full Liquids or solid foods in next 24-48 hours.   Outcome: Goal Not Met.       Plan/rec:   Added Boost Breeze to clear liquid diet to help optimize PO intake.   If diet advancement not feasible in next 24-48 hours, consider nutrition support. Monitor K+ and replete PRN.   RD following.

## 2023-01-20 NOTE — PROGRESS NOTES
Bedside report received.  Assessment complete.  A&O x 4. Patient calls appropriately.  Patient ambulates with no assist.    Patient has 0/10 pain. Pain managed with prescribed medications.  Denies N&V. NPO with sips at this time.  X2 lap sites with tegaderm,CDI. Midline BOLIVAR drain dressing, CDI.  + void, + flatus, + BM.  Patient denies SOB.  SCD's refused.    Review plan with of care with patient. Call light and personal belongings within reach. Hourly rounding in place. All needs met at this time.

## 2023-01-20 NOTE — CARE PLAN
The patient is Stable - Low risk of patient condition declining or worsening    Shift Goals  Clinical Goals: bowel movement  Patient Goals: BM, rest, pain control    Progress made toward(s) clinical / shift goals: +flatus. No bm yet. Ambulating. Denies n/v    Patient is not progressing towards the following goals:

## 2023-01-20 NOTE — CARE PLAN
The patient is Stable - Low risk of patient condition declining or worsening    Shift Goals  Clinical Goals: OOB activity, BM, drain care  Patient Goals: BM, rest, pain control    Progress made toward(s) clinical / shift goals:  patient ambulating in room. -BM this shift. Drain output per flow sheets. Pain controlled per MAR. Patient able to rest comfortably between periods of activity.     Patient is not progressing towards the following goals:

## 2023-01-20 NOTE — PROGRESS NOTES
Assumed care at 1845. Pt resting in bed. A&ox 4  Ambulating independently and frequently  O2 on RA  NPO except sips. Denies n/v  Abd distended . Semi firm. +BS +flatus. Improved per pt  Pain controlled with Zanaflex  Voiding adequately  Abd lap sites CDI. Syd drain in place. Dressing changed  Call light within reach. Hourly rounding in place

## 2023-01-20 NOTE — DOCUMENTATION QUERY
"                                                                         [facility]                                                                       Query Response Note      PATIENT:               NIKKI STAUFFER  ACCT #:                  [id number]  MRN:                     [id number]  :                      1955  ADMIT DATE:       1/15/2023 6:58 PM  DISCH DATE:          RESPONDING  PROVIDER #:        [id number]           QUERY TEXT:    Ileus and ?post operative? are found in the medical record.  Can a clarification be determined?  NOTE:  If an appropriate response is not listed below, please respond with a new note.                Documentation indicators that raised basis for question:   66yo with dx of acute appendicitis with localized peritonitis, laparoscopic appendectomy on 01/15/2023, ileus.     Cooper PN \"Abdomen distended. Vomited 400 cc this a.m.. +Flatus and watery stools.\"   Cooper PN \"KUB with ileus.\"     Abdominal xray \"This is likely postoperative ileus.\"    Risk factors: advanced age, anesthesia, surgical intervention, acute appendicitis  Treatments: imaging, NPO, IVF, monitoring    Contact me with questions.     Thank you,  Kimberly Fonseca, BERTOP, CDI  [email or web address]  Options provided:   -- Ileus is unexpected and a complication of the procedure performed   -- Ileus is not a complication due to or associated with the procedure   -- Other explanation:Other explanation-, please specify   -- Unable to determine      Query created by: Kimberly Fonseca on 2023 10:12 AM    RESPONSE TEXT:    Ileus is unexpected and a complication of the procedure performed          Electronically signed by:  TOSHA DAMIAN MD 2023 7:01 AM              "

## 2023-01-20 NOTE — PROGRESS NOTES
"    DATE: 1/20/2023    Post Operative Day 4 laparoscopic appendectomy.    INTERVAL EVENTS:  Less distended today. Large BM this morning.  BOLIVAR with 220 cc output.  Slight increase in WBC. Afebrile and non toxic in appearance.    - CBC at 1400 to reassess WBC.  - Okay for clear liquid diet.   - Encourage ambulation.    REVIEW OF SYSTEMS:  Review of Systems   Constitutional:  Negative for chills, diaphoresis, fever and malaise/fatigue.   HENT: Negative.     Eyes: Negative.    Respiratory:  Negative for shortness of breath.    Cardiovascular: Negative.    Gastrointestinal:  Negative for abdominal pain, diarrhea, nausea and vomiting.        Last BM 1/20   Genitourinary:  Negative for dysuria.        Voiding   Musculoskeletal:  Negative for myalgias.   Skin: Negative.    Neurological: Negative.    Endo/Heme/Allergies: Negative.    Psychiatric/Behavioral: Negative.     All other systems reviewed and are negative.    PHYSICAL EXAMINATION:  Vital Signs: BP (!) 155/102   Pulse 79   Temp 37.5 °C (99.5 °F) (Temporal)   Resp 17   Ht 1.803 m (5' 11\")   Wt 80.5 kg (177 lb 7.5 oz)   SpO2 93%   Physical Exam  Vitals and nursing note reviewed. Exam conducted with a chaperone present (family at bedside).   Constitutional:       General: He is awake. He is not in acute distress.     Appearance: He is not ill-appearing.   Cardiovascular:      Rate and Rhythm: Normal rate.   Pulmonary:      Effort: Pulmonary effort is normal.   Abdominal:      General: There is distension (less distened.).      Palpations: Abdomen is soft.      Tenderness: There is no abdominal tenderness. There is no guarding.   Musculoskeletal:         General: Normal range of motion.   Neurological:      Mental Status: He is alert and oriented to person, place, and time.   Psychiatric:         Mood and Affect: Mood normal.         Behavior: Behavior is cooperative.       LABORATORY VALUES:   Recent Labs     01/18/23  0111 01/19/23  0056 01/20/23  0306   WBC 11.9* " 10.7 11.1*   RBC 4.73 4.15* 4.16*   HEMOGLOBIN 14.0 12.5* 12.5*   HEMATOCRIT 42.5 37.1* 37.0*   MCV 89.9 89.4 88.9   MCH 29.6 30.1 30.0   MCHC 32.9* 33.7 33.8   RDW 42.7 43.2 43.9   PLATELETCT 194 202 225   MPV 9.8 9.7 9.1     Recent Labs     01/18/23  0111 01/19/23  0056 01/20/23  0306   SODIUM 138 138 137   POTASSIUM 3.7 3.5* 3.5*   CHLORIDE 96 100 101   CO2 29 26 24   GLUCOSE 107* 93 97   BUN 21 26* 19   CREATININE 1.10 1.01 0.76   CALCIUM 8.9 8.4* 8.2*                IMAGING:   [id number] VIEW   Final Result      1.  Air-filled loops of small bowel in the mid and lower abdomen minimally dilated proximally but normal in caliber distally. This is likely postoperative ileus.   2.  There are postoperative changes in the right lower quadrant.      CT-ABDOMEN-PELVIS WITH   Final Result         1.  Findings compatible with acute appendicitis      These findings were discussed with the patient's clinician, Han Hinton, on 1/15/2023 8:41 PM.            ASSESSMENT AND PLAN:   Acute appendicitis with perforation and localized peritonitis, without abscess or gangrene- (present on admission)  Assessment & Plan  2 day hx of RLQ pain, WBC 21K, CT consistent with appendicitis  Status-post laparoscopic appendectomy 1/15.  BOLIVAR drain to bulb suction.  Clear liquid diet until bowel function returns.  IV antibiotics until leukocytosis resolves.  1/18 Vomiting, abdominal distention.  - KUB with ileus. NPO sips with meds.  1/19 IV antibiotics (Rocephin and flagyl) changed to PO (Levaquin and Flagyl). Okay for sips of juice.  1/20 Slight increase in WBC. Advance to clear liquid diet.  - Repeat CBC at 1400.        Discussed patient condition with RN, Patient, and general surgery, Dr. Anguiano.

## 2023-01-21 ENCOUNTER — APPOINTMENT (OUTPATIENT)
Dept: RADIOLOGY | Facility: MEDICAL CENTER | Age: 68
DRG: 339 | End: 2023-01-21
Payer: MEDICARE

## 2023-01-21 LAB
ANION GAP SERPL CALC-SCNC: 11 MMOL/L (ref 7–16)
BASOPHILS # BLD AUTO: 0.7 % (ref 0–1.8)
BASOPHILS # BLD: 0.08 K/UL (ref 0–0.12)
BUN SERPL-MCNC: 14 MG/DL (ref 8–22)
CALCIUM SERPL-MCNC: 8.4 MG/DL (ref 8.5–10.5)
CHLORIDE SERPL-SCNC: 98 MMOL/L (ref 96–112)
CO2 SERPL-SCNC: 27 MMOL/L (ref 20–33)
CREAT SERPL-MCNC: 0.68 MG/DL (ref 0.5–1.4)
EOSINOPHIL # BLD AUTO: 0.2 K/UL (ref 0–0.51)
EOSINOPHIL NFR BLD: 1.7 % (ref 0–6.9)
ERYTHROCYTE [DISTWIDTH] IN BLOOD BY AUTOMATED COUNT: 43.5 FL (ref 35.9–50)
GFR SERPLBLD CREATININE-BSD FMLA CKD-EPI: 101 ML/MIN/1.73 M 2
GLUCOSE SERPL-MCNC: 110 MG/DL (ref 65–99)
HCT VFR BLD AUTO: 37.9 % (ref 42–52)
HGB BLD-MCNC: 12.8 G/DL (ref 14–18)
IMM GRANULOCYTES # BLD AUTO: 0.52 K/UL (ref 0–0.11)
IMM GRANULOCYTES NFR BLD AUTO: 4.3 % (ref 0–0.9)
LYMPHOCYTES # BLD AUTO: 1.08 K/UL (ref 1–4.8)
LYMPHOCYTES NFR BLD: 9 % (ref 22–41)
MAGNESIUM SERPL-MCNC: 1.9 MG/DL (ref 1.5–2.5)
MCH RBC QN AUTO: 29.9 PG (ref 27–33)
MCHC RBC AUTO-ENTMCNC: 33.8 G/DL (ref 33.7–35.3)
MCV RBC AUTO: 88.6 FL (ref 81.4–97.8)
MONOCYTES # BLD AUTO: 1.2 K/UL (ref 0–0.85)
MONOCYTES NFR BLD AUTO: 10 % (ref 0–13.4)
NEUTROPHILS # BLD AUTO: 8.96 K/UL (ref 1.82–7.42)
NEUTROPHILS NFR BLD: 74.3 % (ref 44–72)
NRBC # BLD AUTO: 0 K/UL
NRBC BLD-RTO: 0 /100 WBC
PLATELET # BLD AUTO: 277 K/UL (ref 164–446)
PMV BLD AUTO: 9.3 FL (ref 9–12.9)
POTASSIUM SERPL-SCNC: 3.5 MMOL/L (ref 3.6–5.5)
RBC # BLD AUTO: 4.28 M/UL (ref 4.7–6.1)
SODIUM SERPL-SCNC: 136 MMOL/L (ref 135–145)
WBC # BLD AUTO: 12 K/UL (ref 4.8–10.8)

## 2023-01-21 PROCEDURE — 700105 HCHG RX REV CODE 258: Performed by: SURGERY

## 2023-01-21 PROCEDURE — 83735 ASSAY OF MAGNESIUM: CPT

## 2023-01-21 PROCEDURE — 700117 HCHG RX CONTRAST REV CODE 255

## 2023-01-21 PROCEDURE — A9270 NON-COVERED ITEM OR SERVICE: HCPCS

## 2023-01-21 PROCEDURE — 700111 HCHG RX REV CODE 636 W/ 250 OVERRIDE (IP): Performed by: INTERNAL MEDICINE

## 2023-01-21 PROCEDURE — 770001 HCHG ROOM/CARE - MED/SURG/GYN PRIV*

## 2023-01-21 PROCEDURE — 87040 BLOOD CULTURE FOR BACTERIA: CPT

## 2023-01-21 PROCEDURE — 700105 HCHG RX REV CODE 258: Performed by: INTERNAL MEDICINE

## 2023-01-21 PROCEDURE — 700102 HCHG RX REV CODE 250 W/ 637 OVERRIDE(OP)

## 2023-01-21 PROCEDURE — A9270 NON-COVERED ITEM OR SERVICE: HCPCS | Performed by: REGISTERED NURSE

## 2023-01-21 PROCEDURE — 80048 BASIC METABOLIC PNL TOTAL CA: CPT

## 2023-01-21 PROCEDURE — 85025 COMPLETE CBC W/AUTO DIFF WBC: CPT

## 2023-01-21 PROCEDURE — A9270 NON-COVERED ITEM OR SERVICE: HCPCS | Performed by: SURGERY

## 2023-01-21 PROCEDURE — 700102 HCHG RX REV CODE 250 W/ 637 OVERRIDE(OP): Performed by: REGISTERED NURSE

## 2023-01-21 PROCEDURE — 36415 COLL VENOUS BLD VENIPUNCTURE: CPT

## 2023-01-21 PROCEDURE — 700111 HCHG RX REV CODE 636 W/ 250 OVERRIDE (IP)

## 2023-01-21 PROCEDURE — 700102 HCHG RX REV CODE 250 W/ 637 OVERRIDE(OP): Performed by: SURGERY

## 2023-01-21 PROCEDURE — 74177 CT ABD & PELVIS W/CONTRAST: CPT

## 2023-01-21 PROCEDURE — 99024 POSTOP FOLLOW-UP VISIT: CPT

## 2023-01-21 PROCEDURE — 99223 1ST HOSP IP/OBS HIGH 75: CPT | Performed by: INTERNAL MEDICINE

## 2023-01-21 RX ORDER — POTASSIUM CHLORIDE 20 MEQ/1
20 TABLET, EXTENDED RELEASE ORAL 2 TIMES DAILY
Status: DISCONTINUED | OUTPATIENT
Start: 2023-01-21 | End: 2023-01-22

## 2023-01-21 RX ORDER — ENOXAPARIN SODIUM 100 MG/ML
40 INJECTION SUBCUTANEOUS DAILY
Status: DISCONTINUED | OUTPATIENT
Start: 2023-01-21 | End: 2023-01-25 | Stop reason: HOSPADM

## 2023-01-21 RX ORDER — MAGNESIUM SULFATE HEPTAHYDRATE 40 MG/ML
2 INJECTION, SOLUTION INTRAVENOUS ONCE
Status: COMPLETED | OUTPATIENT
Start: 2023-01-21 | End: 2023-01-21

## 2023-01-21 RX ORDER — LISINOPRIL 10 MG/1
10 TABLET ORAL
Status: DISCONTINUED | OUTPATIENT
Start: 2023-01-22 | End: 2023-01-25 | Stop reason: HOSPADM

## 2023-01-21 RX ORDER — LISINOPRIL 5 MG/1
5 TABLET ORAL ONCE
Status: COMPLETED | OUTPATIENT
Start: 2023-01-21 | End: 2023-01-21

## 2023-01-21 RX ADMIN — POTASSIUM CHLORIDE 20 MEQ: 1500 TABLET, EXTENDED RELEASE ORAL at 17:49

## 2023-01-21 RX ADMIN — ENOXAPARIN SODIUM 40 MG: 40 INJECTION SUBCUTANEOUS at 17:48

## 2023-01-21 RX ADMIN — DOCUSATE SODIUM 100 MG: 100 CAPSULE, LIQUID FILLED ORAL at 17:49

## 2023-01-21 RX ADMIN — SENNOSIDES AND DOCUSATE SODIUM 1 TABLET: 50; 8.6 TABLET ORAL at 22:16

## 2023-01-21 RX ADMIN — PIPERACILLIN AND TAZOBACTAM 4.5 G: 4; .5 INJECTION, POWDER, LYOPHILIZED, FOR SOLUTION INTRAVENOUS; PARENTERAL at 17:51

## 2023-01-21 RX ADMIN — PIPERACILLIN AND TAZOBACTAM 4.5 G: 4; .5 INJECTION, POWDER, LYOPHILIZED, FOR SOLUTION INTRAVENOUS; PARENTERAL at 14:03

## 2023-01-21 RX ADMIN — TAMSULOSIN HYDROCHLORIDE 0.4 MG: 0.4 CAPSULE ORAL at 09:00

## 2023-01-21 RX ADMIN — IOHEXOL 95 ML: 350 INJECTION, SOLUTION INTRAVENOUS at 16:00

## 2023-01-21 RX ADMIN — TIZANIDINE 4 MG: 4 TABLET ORAL at 22:16

## 2023-01-21 RX ADMIN — LISINOPRIL 5 MG: 5 TABLET ORAL at 05:39

## 2023-01-21 RX ADMIN — MAGNESIUM SULFATE HEPTAHYDRATE 2 G: 40 INJECTION, SOLUTION INTRAVENOUS at 14:45

## 2023-01-21 RX ADMIN — LEVOFLOXACIN 750 MG: 500 TABLET, FILM COATED ORAL at 05:33

## 2023-01-21 RX ADMIN — POTASSIUM CHLORIDE 20 MEQ: 1500 TABLET, EXTENDED RELEASE ORAL at 09:01

## 2023-01-21 RX ADMIN — METRONIDAZOLE 500 MG: 500 TABLET ORAL at 05:33

## 2023-01-21 RX ADMIN — SODIUM CHLORIDE, POTASSIUM CHLORIDE, SODIUM LACTATE AND CALCIUM CHLORIDE 1000 ML: 600; 310; 30; 20 INJECTION, SOLUTION INTRAVENOUS at 05:38

## 2023-01-21 RX ADMIN — LISINOPRIL 5 MG: 5 TABLET ORAL at 12:49

## 2023-01-21 RX ADMIN — POLYETHYLENE GLYCOL 3350 1 PACKET: 17 POWDER, FOR SOLUTION ORAL at 05:33

## 2023-01-21 RX ADMIN — DOCUSATE SODIUM 100 MG: 100 CAPSULE, LIQUID FILLED ORAL at 05:33

## 2023-01-21 ASSESSMENT — ENCOUNTER SYMPTOMS
DIAPHORESIS: 0
ABDOMINAL PAIN: 1
ROS GI COMMENTS: LAST BM 1/21
NAUSEA: 0
CONSTIPATION: 0
NEUROLOGICAL NEGATIVE: 1
WEAKNESS: 0
EYES NEGATIVE: 1
PSYCHIATRIC NEGATIVE: 1
SPUTUM PRODUCTION: 0
CARDIOVASCULAR NEGATIVE: 1
BLURRED VISION: 0
DOUBLE VISION: 0
SHORTNESS OF BREATH: 0
ABDOMINAL PAIN: 0
COUGH: 0
CHILLS: 0
NERVOUS/ANXIOUS: 0
FEVER: 0
MYALGIAS: 0
DIARRHEA: 0
VOMITING: 0

## 2023-01-21 ASSESSMENT — PAIN DESCRIPTION - PAIN TYPE
TYPE: ACUTE PAIN

## 2023-01-21 NOTE — CONSULTS
Consults  INFECTIOUS DISEASES INPATIENT CONSULT NOTE     Date of Service: 1/21/2023    Consult Requested By: Heidi Dee M.D.    Reason for Consultation: Polymicrobial bacteremia, perforated appendicitis    History of Present Illness:   Clinton Garcia is a 67 y.o.  admitted 1/15/2023. Pt has a past medical history of 3 days of right lower quadrant abdominal pain which was worsening and so is brought into the ER.  CT scan consistent with acute appendicitis.  General surgery was consulted and he went to the OR for laparoscopic appendectomy.  Per OR note 6 was perforated and there was purulent drainage within the abdomen.  No cultures obtained.  Blood cultures of 1/15 or 1/2 positive with polymicrobial results.  Patient was treated with ceftriaxone and Flagyl from 1/15-1/19 and then transition to levofloxacin and Flagyl.     Review Of Systems:  Review of Systems   Constitutional:  Positive for malaise/fatigue. Negative for chills and fever.   HENT:  Negative for hearing loss.    Eyes:  Negative for blurred vision and double vision.   Respiratory:  Negative for cough, sputum production and shortness of breath.    Cardiovascular:  Negative for chest pain.   Gastrointestinal:  Positive for abdominal pain. Negative for constipation, diarrhea, nausea and vomiting.   Genitourinary:  Negative for dysuria.   Musculoskeletal:  Negative for myalgias.   Skin:  Negative for rash.   Neurological:  Negative for weakness.   Psychiatric/Behavioral:  The patient is not nervous/anxious.      PMH:   History reviewed. No pertinent past medical history.    PSH:  Past Surgical History:   Procedure Laterality Date    OK LAP,APPENDECTOMY N/A 1/15/2023    Procedure: APPENDECTOMY, LAPAROSCOPIC;  Surgeon: Heidi Dee M.D.;  Location: SURGERY [facility];  Service: General       FAMILY HX:  History reviewed. No pertinent family history.  Reviewed family history. No pertinent family history.     SOCIAL HX:  Social History      Socioeconomic History    Marital status: Not on file     Spouse name: Not on file    Number of children: Not on file    Years of education: Not on file    Highest education level: Not on file   Occupational History    Not on file   Tobacco Use    Smoking status: Never    Smokeless tobacco: Never   Substance and Sexual Activity    Alcohol use: Not Currently    Drug use: Not Currently    Sexual activity: Not on file   Other Topics Concern    Not on file   Social History Narrative    Not on file     Social Determinants of Health     Financial Resource Strain: Not on file   Food Insecurity: Not on file   Transportation Needs: Not on file   Physical Activity: Not on file   Stress: Not on file   Social Connections: Not on file   Intimate Partner Violence: Not on file   Housing Stability: Not on file     Social History     Tobacco Use   Smoking Status Never   Smokeless Tobacco Never     Social History     Substance and Sexual Activity   Alcohol Use Not Currently       Allergies/Intolerances:  No Known Allergies    History reviewed with the patient     Other Current Medications:    Current Facility-Administered Medications:     [START ON 1/22/2023] lisinopril (PRINIVIL) tablet 10 mg, 10 mg, Oral, Q DAY, JESUS Basurto.N.P.    potassium chloride SA (Kdur) tablet 20 mEq, 20 mEq, Oral, BID, JESUS Basurto.N.P., 20 mEq at 01/21/23 0901    tizanidine (ZANAFLEX) tablet 4 mg, 4 mg, Oral, Q6HRS PRN, JESUS Basurto.N.P., 4 mg at 01/20/23 2330    metroNIDAZOLE (FLAGYL) tablet 500 mg, 500 mg, Oral, Q8HRS, JESUS Basurto.N.P., 500 mg at 01/21/23 0533    levoFLOXacin (LEVAQUIN) tablet 750 mg, 750 mg, Oral, DAILY, JESUS Basurto.N.P., 750 mg at 01/21/23 0533    tamsulosin (FLOMAX) capsule 0.4 mg, 0.4 mg, Oral, AFTER BREAKFAST, MIGEL Rosa.P.R.N., 0.4 mg at 01/21/23 0900    Respiratory Therapy Consult, , Nebulization, Continuous RT, Heidi Dee M.D.    Pharmacy Consult Request ...Pain  "Management Review 1 Each, 1 Each, Other, PHARMACY TO DOSE, Heidi Dee M.D.    ondansetron (ZOFRAN) syringe/vial injection 4 mg, 4 mg, Intravenous, Q4HRS PRN, Heidi Dee M.D., 4 mg at 01/18/23 0514    ondansetron (ZOFRAN ODT) dispertab 4 mg, 4 mg, Oral, Q4HRS PRN, Heidi Dee M.D.    docusate sodium (COLACE) capsule 100 mg, 100 mg, Oral, BID, Heidi Dee M.D., 100 mg at 01/21/23 0533    senna-docusate (PERICOLACE or SENOKOT S) 8.6-50 MG per tablet 1 Tablet, 1 Tablet, Oral, Nightly, Heidi Dee M.D., 1 Tablet at 01/20/23 2145    senna-docusate (PERICOLACE or SENOKOT S) 8.6-50 MG per tablet 1 Tablet, 1 Tablet, Oral, Q24HRS PRN, Heidi Dee M.D., 1 Tablet at 01/16/23 0633    polyethylene glycol/lytes (MIRALAX) PACKET 1 Packet, 1 Packet, Oral, BID, Heidi Dee M.D., 1 Packet at 01/21/23 0533    magnesium hydroxide (MILK OF MAGNESIA) suspension 30 mL, 30 mL, Oral, DAILY, Heidi Dee M.D., 30 mL at 01/17/23 0420    bisacodyl (DULCOLAX) suppository 10 mg, 10 mg, Rectal, Q24HRS PRN, Heidi Dee M.D.    sodium phosphate (Fleet) enema 133 mL, 1 Each, Rectal, Once PRN, Heidi Dee M.D.    LR infusion, , Intravenous, Continuous, VERENA BasurtoPSaeid, Last Rate: 75 mL/hr at 01/21/23 0740, Rate Change at 01/21/23 0740    [COMPLETED] ketorolac (TORADOL) injection 15 mg, 15 mg, Intravenous, Q6HRS, 15 mg at 01/18/23 2038 **FOLLOWED BY** ibuprofen (MOTRIN) tablet 800 mg, 800 mg, Oral, TID PRN, Heidi Dee M.D.    oxyCODONE immediate-release (ROXICODONE) tablet 5 mg, 5 mg, Oral, Q3HRS PRN, 5 mg at 01/17/23 2146 **OR** oxyCODONE immediate release (ROXICODONE) tablet 10 mg, 10 mg, Oral, Q3HRS PRN **OR** morphine 10 mg/mL injection 4 mg, 4 mg, Intravenous, Q3HRS PRN, Heidi Dee M.D.  [unfilled]    Most Recent Vital Signs:  BP (!) 164/106   Pulse 85   Temp 37.7 °C (99.9 °F) (Temporal)   Resp 18   Ht 1.803 m (5' 11\")   Wt 80.5 kg (177 lb 7.5 oz)   SpO2 93%   BMI 24.75 " kg/m²   Temp  Av.3 °C (99.1 °F)  Min: 36.1 °C (97 °F)  Max: 37.9 °C (100.2 °F)    Physical Exam:  Physical Exam  Constitutional:       Appearance: Normal appearance.   HENT:      Head: Normocephalic and atraumatic.      Right Ear: External ear normal.      Left Ear: External ear normal.      Nose: Nose normal.      Mouth/Throat:      Mouth: Mucous membranes are moist.      Pharynx: Oropharynx is clear.   Eyes:      Extraocular Movements: Extraocular movements intact.      Conjunctiva/sclera: Conjunctivae normal.      Pupils: Pupils are equal, round, and reactive to light.   Cardiovascular:      Rate and Rhythm: Normal rate and regular rhythm.      Heart sounds: Normal heart sounds.   Pulmonary:      Effort: Pulmonary effort is normal.      Breath sounds: Normal breath sounds.   Abdominal:      General: There is distension.      Palpations: Abdomen is soft.      Tenderness: There is abdominal tenderness.      Comments: Right upper quadrant abdominal pain in place with significant amount of serosanguineous fluid   Musculoskeletal:         General: Normal range of motion.      Cervical back: Normal range of motion and neck supple.      Right lower leg: No edema.      Left lower leg: No edema.   Skin:     General: Skin is warm and dry.   Neurological:      General: No focal deficit present.      Mental Status: He is alert and oriented to person, place, and time.   Psychiatric:         Mood and Affect: Mood normal.         Behavior: Behavior normal.         Pertinent Lab Results:  Recent Labs     23  0306 23  1434 23  0214   WBC 11.1* 11.1* 12.0*      Recent Labs     23  0306 23  1434 23  0214   HEMOGLOBIN 12.5* 13.0* 12.8*   HEMATOCRIT 37.0* 38.7* 37.9*   MCV 88.9 90.0 88.6   MCH 30.0 30.2 29.9   PLATELETCT 225 272 277         Recent Labs     23  0056 23  0306 23  0214   SODIUM 138 137 136   POTASSIUM 3.5* 3.5* 3.5*   CHLORIDE 100 101 98   CO2 26 24 27  "  CREATININE 1.01 0.76 0.68        No results for input(s): ALBUMIN in the last 72 hours.    Invalid input(s): AST, ALT, ALKPHOS, BILITOT, TOTALBILIRUB, BILIRUBINTOT, BILIRUBINDIR, BILIRUBININD, ALKALINEPHOS     Pertinent Micro:  Results       Procedure Component Value Units Date/Time    Blood Culture [[id number]] Collected: 01/15/23 2008    Order Status: Completed Specimen: Blood from Peripheral Updated: 01/20/23 2100     Significant Indicator NEG     Source BLD     Site PERIPHERAL     Culture Result No growth after 5 days of incubation.    Narrative:      2 of 2 blood culture x2  Sites order. Per Hospital Policy:  Only change Specimen Src: to \"Line\" if specified by physician  order.  Left AC    Blood Culture [[id number]]  (Abnormal)  (Susceptibility) Collected: 01/15/23 1950    Order Status: Completed Specimen: Blood from Peripheral Updated: 01/20/23 1312     Significant Indicator POS     Source BLD     Site PERIPHERAL     Culture Result Growth detected by Bactec instrument. 01/17/2023  01:06      Pseudomonas aeruginosa      Gram-positive pili  Hungatella teresa  Organism identified by MALDI-TOF research use only library.        Bacteroides uniformis    Narrative:      CALL  [place]  141 tel. [phone],  CALLED  141 tel. [phone] 01/17/2023, 01:08, RB PERF. RESULTS CALLED TO: RN  [id number]  1 of 2 for Blood Culture x 2 sites order. Per Hospital  Policy: Only change Specimen Src: to \"Line\" if specified by  physician order.  No site indicated    Susceptibility       Pseudomonas aeruginosa (1)       Antibiotic Interpretation Microscan   Method Status    Ciprofloxacin Sensitive <=0.25 mcg/mL CHARLEY Final    Cefepime Sensitive <=2 mcg/mL CHARLEY Final    Amikacin Sensitive <=16 mcg/mL CHARLEY Final    Gentamicin Sensitive <=2 mcg/mL CHARLEY Final    Tobramycin Sensitive <=2 mcg/mL CHARLEY Final    Meropenem Sensitive <=1 mcg/mL CHARLEY Final    Pip/Tazobactam Sensitive <=8 mcg/mL CHARLEY Final                       URINALYSIS [[id number]]  (Abnormal) " Collected: 01/15/23 1906    Order Status: Completed Specimen: Urine Updated: 01/15/23 1936     Color Yellow     Character Clear     Specific Gravity 1.027     Ph 5.5     Glucose Negative mg/dL      Ketones 15 mg/dL      Protein 30 mg/dL      Bilirubin Negative     Urobilinogen, Urine 0.2     Nitrite Negative     Leukocyte Esterase Negative     Occult Blood Moderate     Micro Urine Req Microscopic          No results found for: BLOODCULTU, BLDCULT, BCHOLD     Studies:  CT-ABDOMEN-PELVIS WITH    Result Date: 1/15/2023    1/15/2023 8:07 PM HISTORY/REASON FOR EXAM:  RLQ abdominal pain (Age >= 14y); IV contrast only. TECHNIQUE/EXAM DESCRIPTION:   CT scan of the abdomen and pelvis with contrast. Contrast-enhanced helical scanning was obtained from the diaphragmatic domes through the pubic symphysis following the bolus administration of nonionic contrast without complication. 100 mL of Omnipaque 350 nonionic contrast was administered without complication. Low dose optimization technique was utilized for this CT exam including automated exposure control and adjustment of the mA and/or kV according to patient size. COMPARISON: No prior studies available. FINDINGS: Lower Chest: Linear densities the bilateral lung bases favor changes of atelectasis. Liver: Normal. Spleen: Unremarkable. Pancreas: Unremarkable. Gallbladder: No calcified stones. Biliary: Nondilated. Adrenal glands: Normal. Kidneys: Bilateral small renal cysts are seen, otherwise unremarkable without hydronephrosis. Bowel: Multiple appendicoliths seen. The appendix is fluid-filled and dilated. Hazy fat stranding in the right lower quadrant is seen. Lymph nodes: No adenopathy. Vasculature: Unremarkable. Peritoneum: Unremarkable without ascites. Musculoskeletal: No acute or destructive process. Pelvis: No adenopathy or free fluid.     1.  Findings compatible with acute appendicitis These findings were discussed with the patient's clinician, Han Hinton, on  1/15/2023 8:41 PM.    [id number] VIEW    Result Date: 1/18/2023 1/18/2023 10:07 AM HISTORY/REASON FOR EXAM:  Abdominal Pain. History of recent appendectomy TECHNIQUE/EXAM DESCRIPTION AND NUMBER OF VIEWS:  1 view(s) of the abdomen. COMPARISON: CT scan 1/15/2023 FINDINGS: There is postoperative change in the right lower quadrant with surgical clips in a surgical drain in place. There are several air-filled loops of small bowel in the midabdomen which are mildly dilated measuring up to 4 cm in diameter. The more distal air-filled loops of bowel in the lower abdomen are normal in caliber. There is linear left lower lobe atelectasis.     1.  Air-filled loops of small bowel in the mid and lower abdomen minimally dilated proximally but normal in caliber distally. This is likely postoperative ileus. 2.  There are postoperative changes in the right lower quadrant.      ASSESSMENT/PLAN:     67 y.o.  admitted 1/15/2023. Pt has a past medical history right lower quadrant abdominal pain. CT scan consistent with acute appendicitis. He went to the OR for laparoscopic appendectomy.  Per OR note 6 was perforated and there was purulent drainage within the abdomen.  No cultures obtained.  Blood cultures of 1/15 or 1/2 positive with polymicrobial results.  Patient was treated with ceftriaxone and Flagyl from 1/15-1/19 and then transition to levofloxacin and Flagyl.     Problem List  Leukocytosis, ongoing since arrival, overall improved but increased today  Bacteremia vs contaminant  -Blood cultures on 1/15 are 1/22 +Pseudomonas aeruginosa, bacteria species and Hungatella hatheway.  Polymicrobial infection and only 1 of 2 sets is concerning for contaminant.  However, also in the setting of perforated appendicitis.  -No repeat blood cultures were obtained  Perforated appendicitis and peritonitis  -Op report with perforate appendicitis and purulent fluid within the abdomen  -Still with high output from abdominal drain    Plan     ---  Will stop levofloxacin and Flagyl and transition back to Zosyn while inpatient  --- Repeat blood cultures, ordered  --- Recommend CT Abdo pelvis with contrast  --- Drain management per surgery, still with significant output  --- Monitor labs       Dispo: Awaiting culture results and work-up as above   PICC: TBD      Plan of care discussed with surgical NP Kenneth. Will continue to follow.     Chrissy Yusuf M.D.

## 2023-01-21 NOTE — PROGRESS NOTES
Pt BP elevated during this evening. BP at 2100 is 171/97 and at 2200 was 169/97. Notified BLAINE Davis, will continue to monitor. Pt is asymptomatic.

## 2023-01-21 NOTE — PROGRESS NOTES
"    DATE: 1/21/2023    Post Operative Day 5 laparoscopic appendectomy.     INTERVAL EVENTS:  Remains distended. Multiple BMs yesterday and today.  BOLIVAR with 180 cc serosanguineous drainage.  Leukocytosis. Afebrile and non toxic in appearance.    - ID consult requested.  - Mobilization.   - Potassium replaced.  - Increased Lisinopril.    REVIEW OF SYSTEMS:  Review of Systems   Constitutional:  Negative for chills, diaphoresis, fever and malaise/fatigue.   HENT: Negative.     Eyes: Negative.    Respiratory:  Negative for shortness of breath.    Cardiovascular: Negative.    Gastrointestinal:  Negative for abdominal pain, diarrhea, nausea and vomiting.        Last BM 1/21   Genitourinary:  Negative for dysuria.        Voiding   Musculoskeletal:  Negative for myalgias.   Skin: Negative.    Neurological: Negative.    Endo/Heme/Allergies: Negative.    Psychiatric/Behavioral: Negative.     All other systems reviewed and are negative.    PHYSICAL EXAMINATION:  Vital Signs: BP (!) 164/106   Pulse 85   Temp 37.7 °C (99.9 °F) (Temporal)   Resp 18   Ht 1.803 m (5' 11\")   Wt 80.5 kg (177 lb 7.5 oz)   SpO2 93%   Physical Exam  Vitals and nursing note reviewed.   Constitutional:       General: He is awake. He is not in acute distress.     Appearance: He is not ill-appearing.   Cardiovascular:      Rate and Rhythm: Normal rate.   Pulmonary:      Effort: Pulmonary effort is normal.   Abdominal:      General: There is distension (less distened.).      Palpations: Abdomen is soft.      Tenderness: There is no abdominal tenderness. There is no guarding.      Comments: BOLIVAR drain with serosang drainage   Musculoskeletal:         General: Normal range of motion.   Skin:     General: Skin is warm and dry.      Comments: BOLIVAR.  Surgical incisions well approximated, healing.   Neurological:      Mental Status: He is alert and oriented to person, place, and time.   Psychiatric:         Mood and Affect: Mood normal.         Behavior: Behavior " is cooperative.       LABORATORY VALUES:   Recent Labs     01/20/23  0306 01/20/23  1434 01/21/23  0214   WBC 11.1* 11.1* 12.0*   RBC 4.16* 4.30* 4.28*   HEMOGLOBIN 12.5* 13.0* 12.8*   HEMATOCRIT 37.0* 38.7* 37.9*   MCV 88.9 90.0 88.6   MCH 30.0 30.2 29.9   MCHC 33.8 33.6* 33.8   RDW 43.9 43.8 43.5   PLATELETCT 225 272 277   MPV 9.1 9.4 9.3     Recent Labs     01/19/23  0056 01/20/23  0306 01/21/23  0214   SODIUM 138 137 136   POTASSIUM 3.5* 3.5* 3.5*   CHLORIDE 100 101 98   CO2 26 24 27   GLUCOSE 93 97 110*   BUN 26* 19 14   CREATININE 1.01 0.76 0.68   CALCIUM 8.4* 8.2* 8.4*                IMAGING:   [id number] VIEW   Final Result      1.  Air-filled loops of small bowel in the mid and lower abdomen minimally dilated proximally but normal in caliber distally. This is likely postoperative ileus.   2.  There are postoperative changes in the right lower quadrant.      CT-ABDOMEN-PELVIS WITH   Final Result         1.  Findings compatible with acute appendicitis      These findings were discussed with the patient's clinician, Han Hinton, on 1/15/2023 8:41 PM.            ASSESSMENT AND PLAN:   Acute appendicitis with perforation and localized peritonitis, without abscess or gangrene- (present on admission)  Assessment & Plan  2 day hx of RLQ pain, WBC 21K, CT consistent with appendicitis  Status-post laparoscopic appendectomy 1/15.  BOLIVAR drain to bulb suction.  Clear liquid diet until bowel function returns.  IV antibiotics until leukocytosis resolves.  1/18 Vomiting, abdominal distention.  - KUB with ileus. NPO sips with meds.  1/19 IV antibiotics (Rocephin and flagyl) changed to PO (Levaquin and Flagyl). Okay for sips of juice.  1/20 Slight increase in WBC. Advance to clear liquid diet.  - Repeat CBC at 1400.  1/21 Leukocytosis. ID consult requested.        Discussed patient condition with RN, Patient, and general surgery, Dr. Anguiano.

## 2023-01-21 NOTE — PROGRESS NOTES
Assumed care of patient at 0645. Bedside report received. Assessment complete.     A&O x 4, pt using call light appropriately  Mobility: Up independently,  no assistive devices needed   Fall Risk Assessment: No fall risk per davis tre score, bed alarm: n/a,    Pain: patient reports pain well controlled. Educated patient on pain rating scale, prn medications for pain management.   Diet: Clear liquid diet, tolerating well  LDA:   IV Access: 20G right forearm, CDI/ flushed/ infusing per MAR  BOLIVAR: with pink tinged serous output  GI/: + void, + flatus, Last BM 1/20  DVT Prophylaxis: SCD declined at this time, patient ambulating, education provided regarding purpose and importance  Sedrick Score: 21 Minimal risk for skin breakdown, Interventions per flow sheet    Plan of care discussed. Educated regarding importance of oral care. Oral care kit in patient room. All questions answered at this time. Call light is within reach, treaded slipper socks on, bed in lowest/ locked position, hourly rounding in place, all needs met at this time.

## 2023-01-21 NOTE — PROGRESS NOTES
Pt AO x 4  Vital signs stable   Pt denies chest pain or SOB  O2 sat >90% on RA breathing unlabored  Pt denies pain.   Pt denies N/V/D  + voiding, + flatus, + BM 1/20, stool softeners still taken   Bowel sounds normoactive   Pt ambulates self (9 laps around the unit today)  SCDs off, pt ambulating frequently    Updated plan of care discussed with pt. Safety education done. Falls precautions in place.   Pt safety maintained. Hourly rounding done.

## 2023-01-21 NOTE — CARE PLAN
The patient is Stable - Low risk of patient condition declining or worsening    Shift Goals  Clinical Goals: have a bowel movement, tolerate advancement in diet  Patient Goals: BM, rest, pain control    Progress made toward(s) clinical / shift goals: patient reports loose BM this AM; tolerating clear liquid diet     Patient is not progressing towards the following goals:

## 2023-01-22 LAB
ANION GAP SERPL CALC-SCNC: 12 MMOL/L (ref 7–16)
BASOPHILS # BLD AUTO: 0 % (ref 0–1.8)
BASOPHILS # BLD: 0 K/UL (ref 0–0.12)
BUN SERPL-MCNC: 9 MG/DL (ref 8–22)
CALCIUM SERPL-MCNC: 8.8 MG/DL (ref 8.5–10.5)
CHLORIDE SERPL-SCNC: 100 MMOL/L (ref 96–112)
CO2 SERPL-SCNC: 27 MMOL/L (ref 20–33)
CREAT SERPL-MCNC: 0.74 MG/DL (ref 0.5–1.4)
EOSINOPHIL # BLD AUTO: 0.2 K/UL (ref 0–0.51)
EOSINOPHIL NFR BLD: 1.7 % (ref 0–6.9)
ERYTHROCYTE [DISTWIDTH] IN BLOOD BY AUTOMATED COUNT: 44.2 FL (ref 35.9–50)
GFR SERPLBLD CREATININE-BSD FMLA CKD-EPI: 98 ML/MIN/1.73 M 2
GLUCOSE SERPL-MCNC: 104 MG/DL (ref 65–99)
HCT VFR BLD AUTO: 43.5 % (ref 42–52)
HGB BLD-MCNC: 14.5 G/DL (ref 14–18)
LYMPHOCYTES # BLD AUTO: 1.15 K/UL (ref 1–4.8)
LYMPHOCYTES NFR BLD: 9.6 % (ref 22–41)
MANUAL DIFF BLD: NORMAL
MCH RBC QN AUTO: 29.7 PG (ref 27–33)
MCHC RBC AUTO-ENTMCNC: 33.3 G/DL (ref 33.7–35.3)
MCV RBC AUTO: 89 FL (ref 81.4–97.8)
METAMYELOCYTES NFR BLD MANUAL: 1.8 %
MONOCYTES # BLD AUTO: 0.53 K/UL (ref 0–0.85)
MONOCYTES NFR BLD AUTO: 4.4 % (ref 0–13.4)
MORPHOLOGY BLD-IMP: NORMAL
NEUTROPHILS # BLD AUTO: 9.9 K/UL (ref 1.82–7.42)
NEUTROPHILS NFR BLD: 82.5 % (ref 44–72)
NRBC # BLD AUTO: 0 K/UL
NRBC BLD-RTO: 0 /100 WBC
PLATELET # BLD AUTO: 370 K/UL (ref 164–446)
PLATELET BLD QL SMEAR: NORMAL
PMV BLD AUTO: 9.1 FL (ref 9–12.9)
POTASSIUM SERPL-SCNC: 3.3 MMOL/L (ref 3.6–5.5)
RBC # BLD AUTO: 4.89 M/UL (ref 4.7–6.1)
RBC BLD AUTO: NORMAL
SODIUM SERPL-SCNC: 139 MMOL/L (ref 135–145)
WBC # BLD AUTO: 12 K/UL (ref 4.8–10.8)

## 2023-01-22 PROCEDURE — 700111 HCHG RX REV CODE 636 W/ 250 OVERRIDE (IP)

## 2023-01-22 PROCEDURE — 85025 COMPLETE CBC W/AUTO DIFF WBC: CPT

## 2023-01-22 PROCEDURE — A9270 NON-COVERED ITEM OR SERVICE: HCPCS

## 2023-01-22 PROCEDURE — 700102 HCHG RX REV CODE 250 W/ 637 OVERRIDE(OP): Performed by: REGISTERED NURSE

## 2023-01-22 PROCEDURE — 80048 BASIC METABOLIC PNL TOTAL CA: CPT

## 2023-01-22 PROCEDURE — A9270 NON-COVERED ITEM OR SERVICE: HCPCS | Performed by: REGISTERED NURSE

## 2023-01-22 PROCEDURE — 700105 HCHG RX REV CODE 258: Performed by: INTERNAL MEDICINE

## 2023-01-22 PROCEDURE — 99233 SBSQ HOSP IP/OBS HIGH 50: CPT | Performed by: INTERNAL MEDICINE

## 2023-01-22 PROCEDURE — 36415 COLL VENOUS BLD VENIPUNCTURE: CPT

## 2023-01-22 PROCEDURE — 85007 BL SMEAR W/DIFF WBC COUNT: CPT

## 2023-01-22 PROCEDURE — 99024 POSTOP FOLLOW-UP VISIT: CPT

## 2023-01-22 PROCEDURE — 700102 HCHG RX REV CODE 250 W/ 637 OVERRIDE(OP): Performed by: SURGERY

## 2023-01-22 PROCEDURE — 700102 HCHG RX REV CODE 250 W/ 637 OVERRIDE(OP)

## 2023-01-22 PROCEDURE — 770001 HCHG ROOM/CARE - MED/SURG/GYN PRIV*

## 2023-01-22 PROCEDURE — 700111 HCHG RX REV CODE 636 W/ 250 OVERRIDE (IP): Performed by: INTERNAL MEDICINE

## 2023-01-22 PROCEDURE — A9270 NON-COVERED ITEM OR SERVICE: HCPCS | Performed by: SURGERY

## 2023-01-22 RX ORDER — POTASSIUM CHLORIDE 20 MEQ/1
20 TABLET, EXTENDED RELEASE ORAL 3 TIMES DAILY
Status: COMPLETED | OUTPATIENT
Start: 2023-01-22 | End: 2023-01-24

## 2023-01-22 RX ADMIN — DOCUSATE SODIUM 100 MG: 100 CAPSULE, LIQUID FILLED ORAL at 18:10

## 2023-01-22 RX ADMIN — SENNOSIDES AND DOCUSATE SODIUM 1 TABLET: 50; 8.6 TABLET ORAL at 21:46

## 2023-01-22 RX ADMIN — LISINOPRIL 10 MG: 10 TABLET ORAL at 05:17

## 2023-01-22 RX ADMIN — POTASSIUM CHLORIDE 20 MEQ: 1500 TABLET, EXTENDED RELEASE ORAL at 05:17

## 2023-01-22 RX ADMIN — POTASSIUM CHLORIDE 20 MEQ: 1500 TABLET, EXTENDED RELEASE ORAL at 18:10

## 2023-01-22 RX ADMIN — DOCUSATE SODIUM 100 MG: 100 CAPSULE, LIQUID FILLED ORAL at 05:17

## 2023-01-22 RX ADMIN — ENOXAPARIN SODIUM 40 MG: 40 INJECTION SUBCUTANEOUS at 18:11

## 2023-01-22 RX ADMIN — TAMSULOSIN HYDROCHLORIDE 0.4 MG: 0.4 CAPSULE ORAL at 09:10

## 2023-01-22 RX ADMIN — PIPERACILLIN AND TAZOBACTAM 4.5 G: 4; .5 INJECTION, POWDER, LYOPHILIZED, FOR SOLUTION INTRAVENOUS; PARENTERAL at 14:02

## 2023-01-22 RX ADMIN — PIPERACILLIN AND TAZOBACTAM 4.5 G: 4; .5 INJECTION, POWDER, LYOPHILIZED, FOR SOLUTION INTRAVENOUS; PARENTERAL at 21:50

## 2023-01-22 RX ADMIN — PIPERACILLIN AND TAZOBACTAM 4.5 G: 4; .5 INJECTION, POWDER, LYOPHILIZED, FOR SOLUTION INTRAVENOUS; PARENTERAL at 05:18

## 2023-01-22 ASSESSMENT — ENCOUNTER SYMPTOMS
SPUTUM PRODUCTION: 0
ROS GI COMMENTS: LAST BM 1/22
NAUSEA: 0
CARDIOVASCULAR NEGATIVE: 1
FEVER: 0
VOMITING: 0
ABDOMINAL PAIN: 0
DIAPHORESIS: 0
CONSTIPATION: 0
EYES NEGATIVE: 1
COUGH: 0
NERVOUS/ANXIOUS: 0
MYALGIAS: 0
CHILLS: 0
DIARRHEA: 0
PSYCHIATRIC NEGATIVE: 1
SHORTNESS OF BREATH: 0
NEUROLOGICAL NEGATIVE: 1

## 2023-01-22 ASSESSMENT — PAIN DESCRIPTION - PAIN TYPE
TYPE: ACUTE PAIN
TYPE: ACUTE PAIN

## 2023-01-22 NOTE — PROGRESS NOTES
Assumed care of patient at 0645. Bedside report received. Assessment complete.     A&O x 4, pt using call light appropriately  Mobility: Up independently,  no assistive devices needed   Fall Risk Assessment: No fall risk per davis tre score, bed alarm: n/a,  Precautions in place per flowsheets  Pain: patient reports pain well controlled. Educated patient on pain rating scale, prn medications for pain management. Declined intervention at this time  Diet: Clear Liquid, tolerating well  LDA:   IV Access: 20G right forearm, CDI/ flushed/ infusing iv abx per MAR  BOLIVAR: to self suction, dressing CDI  GI/: + void, + flatus, Last BM 1/21  DVT Prophylaxis: Lovenox, SCD off at this time, patient ambulating, education provided regarding purpose and importance  Sedrick Score: 21 Minimal risk for skin breakdown, Interventions per flow sheet    Plan of care discussed. Educated regarding importance of oral care. Oral care kit in patient room. Incentive Spirometer at bedside. Educated patient regarding purpose and importance, patient demonstrated proper use. Encouraged use 10x/hour while awake.     All questions answered at this time. Call light is within reach, treaded slipper socks on, bed in lowest/ locked position, hourly rounding in place, all needs met at this time.

## 2023-01-22 NOTE — PROGRESS NOTES
Re-educated regarding Incentive Spirometer use and importance, patient demonstrated proper use. Encouraged use 10x/hour while awake. Patient reaching 1500 on IS, self motivated

## 2023-01-22 NOTE — DIETARY
"Nutrition Services Brief Update:     Day 5 of admit.  Clinton Garcia is a 67 y.o. male with admitting DX of Perforated appendicitis, Perforated appendix.      Problem: Nutritional:  Goal: Achieve adequate nutritional intake  Description: Diet advancement to Full Liquids or solid foods in next 24-48 hours.   Outcome: Goal Not Met.     Pt remains on clears with PO % of two meals. Today is day 7 of inadequate nutrition. Per surgery MD note on 1/21, \"Clear liquid diet until bowel function returns.\", and \"Multiple BMs yesterday and today.\".    Recommendations/Plan:  Advance diet when medically feasible per MD.  If unable to advance, recommend consideration of nutrition support if within GOC/POC.  Fluids per MD  Monitor weight.    RD following.  "

## 2023-01-22 NOTE — CARE PLAN
The patient is Stable - Low risk of patient condition declining or worsening    Shift Goals  Clinical Goals: BP monitoring, OOB activity  Patient Goals: BP, pain control    Progress made toward(s) clinical / shift goals:  BP per flowsheets. Patient frequently ambulating in room and ty. Patient denies pain at this time.     Patient is not progressing towards the following goals:

## 2023-01-22 NOTE — PROGRESS NOTES
"    DATE: 1/22/2023       Post Operative Day 6 laparoscopic appendectomy.    INTERVAL EVENTS:  Improved distention.   +flatus and BMs.  BOLIVAR with 155 cc serosanguineous drainage.  CT a/p with appendicoliths.    - ID recommendations reviewed.  - NPO at midnight for possible surgery tomorrow.  - Advance diet to Full liquid.    REVIEW OF SYSTEMS:  Review of Systems   Constitutional:  Negative for chills, diaphoresis, fever and malaise/fatigue.   HENT: Negative.     Eyes: Negative.    Respiratory:  Negative for shortness of breath.    Cardiovascular: Negative.    Gastrointestinal:  Negative for abdominal pain, diarrhea, nausea and vomiting.        Last BM 1/22   Genitourinary:  Negative for dysuria.        Voiding   Musculoskeletal:  Negative for myalgias.   Skin: Negative.    Neurological: Negative.    Endo/Heme/Allergies: Negative.    Psychiatric/Behavioral: Negative.     All other systems reviewed and are negative.    PHYSICAL EXAMINATION:  Vital Signs: BP (!) 152/95   Pulse 80   Temp 36.7 °C (98 °F) (Temporal)   Resp 18   Ht 1.803 m (5' 11\")   Wt 80.5 kg (177 lb 7.5 oz)   SpO2 93%   Physical Exam  Vitals and nursing note reviewed.   Constitutional:       General: He is awake. He is not in acute distress.     Appearance: He is not ill-appearing.   HENT:      Mouth/Throat:      Mouth: Mucous membranes are moist.      Pharynx: Oropharynx is clear.   Cardiovascular:      Rate and Rhythm: Normal rate.   Pulmonary:      Effort: Pulmonary effort is normal.   Abdominal:      General: There is distension (less distened.).      Palpations: Abdomen is soft.      Tenderness: There is no abdominal tenderness. There is no guarding.      Comments: BOLIVAR drain with serosang drainage   Musculoskeletal:         General: Normal range of motion.   Skin:     General: Skin is warm and dry.      Comments: BOLIVAR.  Surgical incisions well approximated, healing.   Neurological:      Mental Status: He is alert and oriented to person, place, and " time.   Psychiatric:         Mood and Affect: Mood normal.         Behavior: Behavior is cooperative.       LABORATORY VALUES:   Recent Labs     01/20/23  1434 01/21/23  0214 01/22/23  0341   WBC 11.1* 12.0* 12.0*   RBC 4.30* 4.28* 4.89   HEMOGLOBIN 13.0* 12.8* 14.5   HEMATOCRIT 38.7* 37.9* 43.5   MCV 90.0 88.6 89.0   MCH 30.2 29.9 29.7   MCHC 33.6* 33.8 33.3*   RDW 43.8 43.5 44.2   PLATELETCT 272 277 370   MPV 9.4 9.3 9.1     Recent Labs     01/20/23  0306 01/21/23  0214 01/22/23  0341   SODIUM 137 136 139   POTASSIUM 3.5* 3.5* 3.3*   CHLORIDE 101 98 100   CO2 24 27 27   GLUCOSE 97 110* 104*   BUN 19 14 9   CREATININE 0.76 0.68 0.74   CALCIUM 8.2* 8.4* 8.8                IMAGING:   CT-ABDOMEN-PELVIS WITH   Final Result      1.  Interval operative intervention with a new surgical drain in the periappendiceal region and the appendix appears shorter with vascular clips in the region. This suggests partial appendectomy with appendiceal stump containing at least 2 residual    appendicoliths      2.  No abscess is confirmed but there is a minimal free fluid in the pelvis and right paracolic gutter region      3.  New small-moderate right, small left pleural effusions and basilar atelectasis      NC-ILPPOHT-4 VIEW   Final Result      1.  Air-filled loops of small bowel in the mid and lower abdomen minimally dilated proximally but normal in caliber distally. This is likely postoperative ileus.   2.  There are postoperative changes in the right lower quadrant.      CT-ABDOMEN-PELVIS WITH   Final Result         1.  Findings compatible with acute appendicitis      These findings were discussed with the patient's clinician, Han Hinton, on 1/15/2023 8:41 PM.                  DVT Prophylaxis: Enoxaparin (Lovenox)            ASSESSMENT AND PLAN:   Acute appendicitis with perforation and localized peritonitis, without abscess or gangrene- (present on admission)  Assessment & Plan  2 day hx of RLQ pain, WBC 21K, CT consistent  with appendicitis  Status-post laparoscopic appendectomy 1/15.  BOLIVAR drain to bulb suction.  Clear liquid diet until bowel function returns.  IV antibiotics until leukocytosis resolves.  1/18 Vomiting, abdominal distention.  - KUB with ileus. NPO sips with meds.  1/19 IV antibiotics (Rocephin and flagyl) changed to PO (Levaquin and Flagyl). Okay for sips of juice.  1/20 Slight increase in WBC. Advance to clear liquid diet.  - Repeat CBC at 1400.  1/21 Leukocytosis. ID consult requested.  - Zosyn restarted.  1/22 CT a/p with appendicoliths.  - NPO at midnight for possible surgery.        Discussed patient condition with RN, Patient, and general surgery, Dr. Anguiano.

## 2023-01-22 NOTE — CARE PLAN
The patient is Stable - Low risk of patient condition declining or worsening    Shift Goals  Clinical Goals: BP monitoring, OOB activity,  Patient Goals: BM, rest, pain control    Progress made toward(s) clinical / shift goals:  BP per flowsheets, patient ambulating frequently in room and ty. Reports pain fairly well controlled.     Patient is not progressing towards the following goals:

## 2023-01-22 NOTE — PROGRESS NOTES
Infectious Disease Progress Note    Author: Chrissy Yusuf M.D. Date & Time of service: 2023  9:31 AM    Chief Complaint:  Polymicrobial bacteremia, perforated appendicitis    Interval History:      Review of Systems:  Review of Systems   Respiratory:  Negative for cough, sputum production and shortness of breath.    Gastrointestinal:  Negative for abdominal pain, constipation, diarrhea, nausea and vomiting.   Genitourinary:  Negative for dysuria.   Musculoskeletal:  Negative for myalgias.   Psychiatric/Behavioral:  The patient is not nervous/anxious.      Hemodynamics:  Temp (24hrs), Av.3 °C (99.1 °F), Min:36.8 °C (98.2 °F), Max:37.7 °C (99.9 °F)  Temperature: 36.8 °C (98.2 °F), Monitored Temp: 37.3 °C (99.1 °F)  Pulse  Av.7  Min: 73  Max: 103   Blood Pressure : (!) 164/94       Physical Exam:  Physical Exam  Cardiovascular:      Rate and Rhythm: Normal rate and regular rhythm.      Heart sounds: Normal heart sounds.   Pulmonary:      Effort: Pulmonary effort is normal.      Breath sounds: Normal breath sounds.   Abdominal:      General: There is distension.      Palpations: Abdomen is soft.      Tenderness: There is no abdominal tenderness.      Comments: Drain in place with serosanguineous fluid   Musculoskeletal:         General: Normal range of motion.   Skin:     General: Skin is warm and dry.   Neurological:      General: No focal deficit present.      Mental Status: He is oriented to person, place, and time.   Psychiatric:         Mood and Affect: Mood normal.         Behavior: Behavior normal.       Meds:    Current Facility-Administered Medications:     lisinopril    potassium chloride SA    [COMPLETED] piperacillin-tazobactam **AND** piperacillin-tazobactam    enoxaparin (LOVENOX) injection    tizanidine    tamsulosin    Respiratory Therapy Consult    Pharmacy Consult Request    ondansetron    ondansetron    docusate sodium    senna-docusate    senna-docusate    polyethylene  glycol/lytes    magnesium hydroxide    bisacodyl    sodium phosphate    [COMPLETED] ketorolac **FOLLOWED BY** ibuprofen    oxyCODONE immediate-release **OR** oxyCODONE immediate-release **OR** morphine injection    Labs:  Recent Labs     01/20/23  1434 01/21/23  0214 01/22/23  0341   WBC 11.1* 12.0* 12.0*   RBC 4.30* 4.28* 4.89   HEMOGLOBIN 13.0* 12.8* 14.5   HEMATOCRIT 38.7* 37.9* 43.5   MCV 90.0 88.6 89.0   MCH 30.2 29.9 29.7   RDW 43.8 43.5 44.2   PLATELETCT 272 277 370   MPV 9.4 9.3 9.1   NEUTSPOLYS 76.30* 74.30* 82.50*   LYMPHOCYTES 6.80* 9.00* 9.60*   MONOCYTES 9.90 10.00 4.40   EOSINOPHILS 2.00 1.70 1.70   BASOPHILS 0.90 0.70 0.00   RBCMORPHOLO  --   --  Normal     Recent Labs     01/20/23  0306 01/21/23  0214 01/22/23  0341   SODIUM 137 136 139   POTASSIUM 3.5* 3.5* 3.3*   CHLORIDE 101 98 100   CO2 24 27 27   GLUCOSE 97 110* 104*   BUN 19 14 9     Recent Labs     01/20/23  0306 01/21/23  0214 01/22/23  0341   CREATININE 0.76 0.68 0.74       Imaging:  CT-ABDOMEN-PELVIS WITH    Result Date: 1/21/2023 1/21/2023 3:41 PM HISTORY/REASON FOR EXAM:  leukocytosis, rule out abscess. Postoperative day 5 with leukocytosis, appendectomy. TECHNIQUE/EXAM DESCRIPTION:   CT scan of the abdomen and pelvis with contrast. Contrast-enhanced helical scanning was obtained from the diaphragmatic domes through the pubic symphysis following the bolus administration of nonionic contrast without complication. 95 mL of Omnipaque 350 nonionic contrast was administered without complication. Low dose optimization technique was utilized for this CT exam including automated exposure control and adjustment of the mA and/or kV according to patient size. COMPARISON: 1/15/2023 CT FINDINGS: Lower Chest: Moderate right, small left layering low-density pleural effusions. There is adjacent linear pulmonary opacity with volume loss most consistent with atelectasis.. There has been interval operative procedure with vascular clips in the right  paracolic gutter and a surgical drain adjacent to what appears to be an appendiceal stump. The entire length of the appendix is no longer seen and some of the appendicoliths are  no longer visualized. There are however at least 2 residual appendicoliths and there are adjacent vascular clips. The larger measures 6 mm. The appendix stump measures 13 mm Some fat stranding is seen in the region and its similar to comparison. There is no loculated fluid collection confirmed The remainder of the bowel is within normal limits without abnormal dilatation or increased stool volume. Liver: Normal. Spleen: Unremarkable. Pancreas: Unremarkable. Gallbladder: No calcified stones. Biliary: Nondilated. Adrenal glands: Normal. Kidneys: Right cortical hypodense mass most consistent with a cyst. This does not have features requiring follow-up. There is no hydronephrosis. Bowel: No obstruction or acute inflammation. Lymph nodes: No adenopathy. Vasculature: Unremarkable. Minimal atherosclerosis Musculoskeletal: No acute or destructive process. Pelvis: No adenopathy There is trace low-density free fluid. No rim-enhancing fluid collection to indicate abscess No prostatomegaly or abnormal bladder dilatation     1.  Interval operative intervention with a new surgical drain in the periappendiceal region and the appendix appears shorter with vascular clips in the region. This suggests partial appendectomy with appendiceal stump containing at least 2 residual appendicoliths 2.  No abscess is confirmed but there is a minimal free fluid in the pelvis and right paracolic gutter region 3.  New small-moderate right, small left pleural effusions and basilar atelectasis    CT-ABDOMEN-PELVIS WITH    Result Date: 1/15/2023    1/15/2023 8:07 PM HISTORY/REASON FOR EXAM:  RLQ abdominal pain (Age >= 14y); IV contrast only. TECHNIQUE/EXAM DESCRIPTION:   CT scan of the abdomen and pelvis with contrast. Contrast-enhanced helical scanning was obtained from the  diaphragmatic domes through the pubic symphysis following the bolus administration of nonionic contrast without complication. 100 mL of Omnipaque 350 nonionic contrast was administered without complication. Low dose optimization technique was utilized for this CT exam including automated exposure control and adjustment of the mA and/or kV according to patient size. COMPARISON: No prior studies available. FINDINGS: Lower Chest: Linear densities the bilateral lung bases favor changes of atelectasis. Liver: Normal. Spleen: Unremarkable. Pancreas: Unremarkable. Gallbladder: No calcified stones. Biliary: Nondilated. Adrenal glands: Normal. Kidneys: Bilateral small renal cysts are seen, otherwise unremarkable without hydronephrosis. Bowel: Multiple appendicoliths seen. The appendix is fluid-filled and dilated. Hazy fat stranding in the right lower quadrant is seen. Lymph nodes: No adenopathy. Vasculature: Unremarkable. Peritoneum: Unremarkable without ascites. Musculoskeletal: No acute or destructive process. Pelvis: No adenopathy or free fluid.     1.  Findings compatible with acute appendicitis These findings were discussed with the patient's clinician, Han Hinton, on 1/15/2023 8:41 PM.    [id number] VIEW    Result Date: 1/18/2023 1/18/2023 10:07 AM HISTORY/REASON FOR EXAM:  Abdominal Pain. History of recent appendectomy TECHNIQUE/EXAM DESCRIPTION AND NUMBER OF VIEWS:  1 view(s) of the abdomen. COMPARISON: CT scan 1/15/2023 FINDINGS: There is postoperative change in the right lower quadrant with surgical clips in a surgical drain in place. There are several air-filled loops of small bowel in the midabdomen which are mildly dilated measuring up to 4 cm in diameter. The more distal air-filled loops of bowel in the lower abdomen are normal in caliber. There is linear left lower lobe atelectasis.     1.  Air-filled loops of small bowel in the mid and lower abdomen minimally dilated proximally but normal in caliber  "distally. This is likely postoperative ileus. 2.  There are postoperative changes in the right lower quadrant.      Micro:  Results       Procedure Component Value Units Date/Time    BLOOD CULTURE [[id number]] Collected: 01/21/23 1258    Order Status: Completed Specimen: Blood from Peripheral Updated: 01/22/23 0739     Significant Indicator NEG     Source BLD     Site PERIPHERAL     Culture Result No Growth  Note: Blood cultures are incubated for 5 days and  are monitored continuously.Positive blood cultures  are called to the RN and reported as soon as  they are identified.      Narrative:      Per Hospital Policy: Only change Specimen Src: to \"Line\" if  specified by physician order.  Left AC    BLOOD CULTURE [[id number]] Collected: 01/21/23 1258    Order Status: Completed Specimen: Blood from Peripheral Updated: 01/22/23 0739     Significant Indicator NEG     Source BLD     Site PERIPHERAL     Culture Result No Growth  Note: Blood cultures are incubated for 5 days and  are monitored continuously.Positive blood cultures  are called to the RN and reported as soon as  they are identified.      Narrative:      Per Hospital Policy: Only change Specimen Src: to \"Line\" if  specified by physician order.  Left Hand    Blood Culture [[id number]] Collected: 01/15/23 2008    Order Status: Completed Specimen: Blood from Peripheral Updated: 01/20/23 2100     Significant Indicator NEG     Source BLD     Site PERIPHERAL     Culture Result No growth after 5 days of incubation.    Narrative:      2 of 2 blood culture x2  Sites order. Per Hospital Policy:  Only change Specimen Src: to \"Line\" if specified by physician  order.  Left AC    Blood Culture [[id number]]  (Abnormal)  (Susceptibility) Collected: 01/15/23 1950    Order Status: Completed Specimen: Blood from Peripheral Updated: 01/20/23 1312     Significant Indicator POS     Source BLD     Site PERIPHERAL     Culture Result Growth detected by Bactec instrument. 01/17/2023  01:06 " "     Pseudomonas aeruginosa      Gram-positive pili  Hungatella ferchoabhisheki  Organism identified by MALDI-TOF research use only library.        Bacteroides uniformis    Narrative:      CALL  [place]  141 tel. [phone],  CALLED  141 tel. [phone] 01/17/2023, 01:08, RB PERF. RESULTS CALLED TO: RN  [id number]  1 of 2 for Blood Culture x 2 sites order. Per Hospital  Policy: Only change Specimen Src: to \"Line\" if specified by  physician order.  No site indicated    Susceptibility       Pseudomonas aeruginosa (1)       Antibiotic Interpretation Microscan   Method Status    Ciprofloxacin Sensitive <=0.25 mcg/mL CHARLEY Final    Cefepime Sensitive <=2 mcg/mL CHARLEY Final    Amikacin Sensitive <=16 mcg/mL CHARLEY Final    Gentamicin Sensitive <=2 mcg/mL CHARLEY Final    Tobramycin Sensitive <=2 mcg/mL CHARLEY Final    Meropenem Sensitive <=1 mcg/mL CHARLEY Final    Pip/Tazobactam Sensitive <=8 mcg/mL CHARLEY Final                       URINALYSIS [[id number]]  (Abnormal) Collected: 01/15/23 1906    Order Status: Completed Specimen: Urine Updated: 01/15/23 1936     Color Yellow     Character Clear     Specific Gravity 1.027     Ph 5.5     Glucose Negative mg/dL      Ketones 15 mg/dL      Protein 30 mg/dL      Bilirubin Negative     Urobilinogen, Urine 0.2     Nitrite Negative     Leukocyte Esterase Negative     Occult Blood Moderate     Micro Urine Req Microscopic            Assessment:  Active Hospital Problems    Diagnosis     *Perforated appendicitis [K35.32]     Acute appendicitis with perforation and localized peritonitis, without abscess or gangrene [K35.32]     Perforated appendix [K35.32]      Interval 24 hours:      AF, O2 RA  Labs reviewed  Imaging personally reviewed both images and report.   Micro reviewed  Drain output 155 cc in last 24 hours, has been slowly decreasing still high output    Patient with no new complaints today, no significant abdominal pain.  Continues on antibiotics as below.    ASSESSMENT/PLAN:      67 y.o.  admitted 1/15/2023. " Pt has a past medical history right lower quadrant abdominal pain. CT scan consistent with acute appendicitis. He went to the OR for laparoscopic appendectomy.  Per OR note 6 was perforated and there was purulent drainage within the abdomen.  No cultures obtained.  Blood cultures of 1/15 or 1/2 positive with polymicrobial results.  Patient was treated with ceftriaxone and Flagyl from 1/15-1/19 and then transition to levofloxacin and Flagyl.      Problem List  Leukocytosis, ongoing since arrival, overall improved but increased on 1/21, stable today at 12  Bacteremia vs contaminant  -Blood cultures on 1/15 are 1/22 +Pseudomonas aeruginosa, bacteria species and Hungatella hatheway.  Polymicrobial infection and only 1 of 2 sets is concerning for contaminated sample.  However, also in the setting of perforated appendicitis.  -No repeat blood cultures.  Until 1/21, these are so far no growth to date  Perforated appendicitis and peritonitis  -Op report with perforate appendicitis and purulent fluid within the abdomen  -Still with high output from abdominal drain  -CT abdomen and pelvis with contrast 1/21 notes operative intervention with surgical drain in the periappendiceal region, appendix shorter suggesting partial appendectomy with appendiceal stump containing at least 2 residual appendicoliths.  No abscess minimal free fluid.  Pleural effusions, bilateral, small   -CT abdomen pelvis as above with finding of new small to moderate on right and small left pleural effusions with some basilar atelectasis  Plan      --- Continue Zosyn 4.5 g every 8 hours while inpatient, will recommend least a 2-week antibiotic course from date of or and will have oral options for discharge.  If he is discharged we will transition back to levofloxacin 750 mg daily and Flagyl 500 mg 3 times daily to complete course- end 1/29/23  --- F/up repeat blood cultures, NGTD   --- Drain management per surgery, still with significant output  ---  Monitor labs         Dispo: Awaiting culture results and work-up as above   PICC: No        Plan of care discussed with surgical NP Cooper. Will continue to follow.      Chrissy Yusuf M.D.

## 2023-01-22 NOTE — PROGRESS NOTES
Pt AO x 4  Vital signs stable   Pt denies chest pain or SOB  O2 sat >90% on RA breathing unlabored  Pt denies pain.   Pt denies N/V/D  + voiding, + flatus, + BM 1/21, water and small, stool softeners still taken.  Hypoactive and distant bowel sounds.   Abd is more distended than previous and semi-firm.   Pt ambulates self (12 laps around the unit today)  SCDs off, pt ambulating frequently     Updated plan of care discussed with pt. Safety education done. Falls precautions in place.   Pt safety maintained. Hourly rounding done.

## 2023-01-23 LAB
ANION GAP SERPL CALC-SCNC: 10 MMOL/L (ref 7–16)
BASOPHILS # BLD AUTO: 0.9 % (ref 0–1.8)
BASOPHILS # BLD: 0.1 K/UL (ref 0–0.12)
BUN SERPL-MCNC: 8 MG/DL (ref 8–22)
CALCIUM SERPL-MCNC: 8.3 MG/DL (ref 8.5–10.5)
CHLORIDE SERPL-SCNC: 103 MMOL/L (ref 96–112)
CO2 SERPL-SCNC: 26 MMOL/L (ref 20–33)
CREAT SERPL-MCNC: 0.79 MG/DL (ref 0.5–1.4)
EOSINOPHIL # BLD AUTO: 0.28 K/UL (ref 0–0.51)
EOSINOPHIL NFR BLD: 2.6 % (ref 0–6.9)
ERYTHROCYTE [DISTWIDTH] IN BLOOD BY AUTOMATED COUNT: 43.7 FL (ref 35.9–50)
GFR SERPLBLD CREATININE-BSD FMLA CKD-EPI: 96 ML/MIN/1.73 M 2
GLUCOSE SERPL-MCNC: 105 MG/DL (ref 65–99)
HCT VFR BLD AUTO: 37.9 % (ref 42–52)
HGB BLD-MCNC: 12.7 G/DL (ref 14–18)
LYMPHOCYTES # BLD AUTO: 1.12 K/UL (ref 1–4.8)
LYMPHOCYTES NFR BLD: 10.3 % (ref 22–41)
MANUAL DIFF BLD: NORMAL
MCH RBC QN AUTO: 29.6 PG (ref 27–33)
MCHC RBC AUTO-ENTMCNC: 33.5 G/DL (ref 33.7–35.3)
MCV RBC AUTO: 88.3 FL (ref 81.4–97.8)
METAMYELOCYTES NFR BLD MANUAL: 1.7 %
MONOCYTES # BLD AUTO: 0.19 K/UL (ref 0–0.85)
MONOCYTES NFR BLD AUTO: 1.7 % (ref 0–13.4)
MORPHOLOGY BLD-IMP: NORMAL
MYELOCYTES NFR BLD MANUAL: 2.6 %
NEUTROPHILS # BLD AUTO: 8.74 K/UL (ref 1.82–7.42)
NEUTROPHILS NFR BLD: 79.3 % (ref 44–72)
NEUTS BAND NFR BLD MANUAL: 0.9 % (ref 0–10)
NRBC # BLD AUTO: 0 K/UL
NRBC BLD-RTO: 0 /100 WBC
PLATELET # BLD AUTO: 359 K/UL (ref 164–446)
PLATELET BLD QL SMEAR: NORMAL
PMV BLD AUTO: 8.9 FL (ref 9–12.9)
POTASSIUM SERPL-SCNC: 3.7 MMOL/L (ref 3.6–5.5)
RBC # BLD AUTO: 4.29 M/UL (ref 4.7–6.1)
RBC BLD AUTO: PRESENT
SODIUM SERPL-SCNC: 139 MMOL/L (ref 135–145)
TOXIC GRANULES BLD QL SMEAR: SLIGHT
WBC # BLD AUTO: 10.9 K/UL (ref 4.8–10.8)

## 2023-01-23 PROCEDURE — 700105 HCHG RX REV CODE 258: Performed by: INTERNAL MEDICINE

## 2023-01-23 PROCEDURE — 770001 HCHG ROOM/CARE - MED/SURG/GYN PRIV*

## 2023-01-23 PROCEDURE — 700111 HCHG RX REV CODE 636 W/ 250 OVERRIDE (IP): Performed by: INTERNAL MEDICINE

## 2023-01-23 PROCEDURE — 700111 HCHG RX REV CODE 636 W/ 250 OVERRIDE (IP)

## 2023-01-23 PROCEDURE — 36415 COLL VENOUS BLD VENIPUNCTURE: CPT

## 2023-01-23 PROCEDURE — A9270 NON-COVERED ITEM OR SERVICE: HCPCS

## 2023-01-23 PROCEDURE — A9270 NON-COVERED ITEM OR SERVICE: HCPCS | Performed by: REGISTERED NURSE

## 2023-01-23 PROCEDURE — 80048 BASIC METABOLIC PNL TOTAL CA: CPT

## 2023-01-23 PROCEDURE — 85007 BL SMEAR W/DIFF WBC COUNT: CPT

## 2023-01-23 PROCEDURE — 700102 HCHG RX REV CODE 250 W/ 637 OVERRIDE(OP): Performed by: REGISTERED NURSE

## 2023-01-23 PROCEDURE — 85025 COMPLETE CBC W/AUTO DIFF WBC: CPT

## 2023-01-23 PROCEDURE — A9270 NON-COVERED ITEM OR SERVICE: HCPCS | Performed by: SURGERY

## 2023-01-23 PROCEDURE — 700102 HCHG RX REV CODE 250 W/ 637 OVERRIDE(OP): Performed by: SURGERY

## 2023-01-23 PROCEDURE — 99024 POSTOP FOLLOW-UP VISIT: CPT

## 2023-01-23 PROCEDURE — 700102 HCHG RX REV CODE 250 W/ 637 OVERRIDE(OP)

## 2023-01-23 RX ORDER — METOCLOPRAMIDE HYDROCHLORIDE 5 MG/ML
10 INJECTION INTRAMUSCULAR; INTRAVENOUS EVERY 6 HOURS
Status: DISCONTINUED | OUTPATIENT
Start: 2023-01-23 | End: 2023-01-25 | Stop reason: HOSPADM

## 2023-01-23 RX ADMIN — POTASSIUM CHLORIDE 20 MEQ: 1500 TABLET, EXTENDED RELEASE ORAL at 05:45

## 2023-01-23 RX ADMIN — METOCLOPRAMIDE 10 MG: 5 INJECTION, SOLUTION INTRAMUSCULAR; INTRAVENOUS at 12:59

## 2023-01-23 RX ADMIN — ENOXAPARIN SODIUM 40 MG: 40 INJECTION SUBCUTANEOUS at 17:48

## 2023-01-23 RX ADMIN — METOCLOPRAMIDE 10 MG: 5 INJECTION, SOLUTION INTRAMUSCULAR; INTRAVENOUS at 23:02

## 2023-01-23 RX ADMIN — PIPERACILLIN AND TAZOBACTAM 4.5 G: 4; .5 INJECTION, POWDER, LYOPHILIZED, FOR SOLUTION INTRAVENOUS; PARENTERAL at 21:10

## 2023-01-23 RX ADMIN — SENNOSIDES AND DOCUSATE SODIUM 1 TABLET: 50; 8.6 TABLET ORAL at 21:09

## 2023-01-23 RX ADMIN — TIZANIDINE 4 MG: 4 TABLET ORAL at 00:32

## 2023-01-23 RX ADMIN — PIPERACILLIN AND TAZOBACTAM 4.5 G: 4; .5 INJECTION, POWDER, LYOPHILIZED, FOR SOLUTION INTRAVENOUS; PARENTERAL at 05:51

## 2023-01-23 RX ADMIN — POTASSIUM CHLORIDE 20 MEQ: 1500 TABLET, EXTENDED RELEASE ORAL at 17:49

## 2023-01-23 RX ADMIN — POTASSIUM CHLORIDE 20 MEQ: 1500 TABLET, EXTENDED RELEASE ORAL at 12:59

## 2023-01-23 RX ADMIN — DOCUSATE SODIUM 100 MG: 100 CAPSULE, LIQUID FILLED ORAL at 17:49

## 2023-01-23 RX ADMIN — PIPERACILLIN AND TAZOBACTAM 4.5 G: 4; .5 INJECTION, POWDER, LYOPHILIZED, FOR SOLUTION INTRAVENOUS; PARENTERAL at 12:59

## 2023-01-23 RX ADMIN — TIZANIDINE 4 MG: 4 TABLET ORAL at 23:02

## 2023-01-23 RX ADMIN — LISINOPRIL 10 MG: 10 TABLET ORAL at 05:45

## 2023-01-23 RX ADMIN — DOCUSATE SODIUM 100 MG: 100 CAPSULE, LIQUID FILLED ORAL at 05:46

## 2023-01-23 RX ADMIN — METOCLOPRAMIDE 10 MG: 5 INJECTION, SOLUTION INTRAMUSCULAR; INTRAVENOUS at 17:49

## 2023-01-23 RX ADMIN — TAMSULOSIN HYDROCHLORIDE 0.4 MG: 0.4 CAPSULE ORAL at 09:10

## 2023-01-23 ASSESSMENT — ENCOUNTER SYMPTOMS
ABDOMINAL PAIN: 0
EYES NEGATIVE: 1
NEUROLOGICAL NEGATIVE: 1
FEVER: 0
DIAPHORESIS: 0
CARDIOVASCULAR NEGATIVE: 1
MYALGIAS: 0
CHILLS: 0
ROS GI COMMENTS: LAST BM 1/22
NAUSEA: 0
PSYCHIATRIC NEGATIVE: 1
DIARRHEA: 0
SHORTNESS OF BREATH: 0
VOMITING: 0

## 2023-01-23 ASSESSMENT — PAIN DESCRIPTION - PAIN TYPE: TYPE: ACUTE PAIN

## 2023-01-23 NOTE — PROGRESS NOTES
Pt is A&O 4  Pain -   - nausea  Tolerating a clear liquid diet, NPO @ 12  Incision + x2 lap sites   + Drains lower mid abdomen BOLIVAR   + Voids  + flatus  - BM  Up SBA  SCD's off pt ambulatory   Wife at bedside  Bed alarm off, pt low fall risk per ryan toledo  Reviewed plan of care with patient, bed in lowest position and locked, pt resting comfortably now, call light within reach, all needs met at this time. Interventions will be executed per plan of care

## 2023-01-23 NOTE — PROGRESS NOTES
Assumed care of patient at 0645. Bedside report received. Assessment complete.     A&O x 4, pt using call light appropriately  Mobility: Up independently,  no assistive devices needed. Patient ambulating frequently in the ty  Fall Risk Assessment: No fall risk per davis tre score, bed alarm: n/a,  Pain: patient reports pain well controlled. Educated patient on pain rating scale, prn medications for pain management. Declined intervention at this time  Diet: NPO at this time, tolerating well  LDA:   IV Access: 20G right forearm, CDI/ flushed/ SL. Dressing changed per protocol  BOLIVAR: + output, dressing CDI  Wounds: lap sites x2, well approximated, no evidence of opening areas  GI/: + void, + flatus, Last BM 1/22  DVT Prophylaxis: Lovenox, SCD off, patient ambulating, education provided regarding purpose and importance  Sedrick Score: 22 Minimal risk for skin breakdown, Interventions per flow sheet    Plan of care discussed. Educated regarding importance of oral care. Oral care kit in patient room. Incentive Spirometer at bedside. Educated patient regarding purpose and importance, patient demonstrated proper use. Encouraged use 10x/hour while awake. Patient self motivated with good effort. IS Volume: 2000    All questions answered at this time. Call light is within reach, treaded slipper socks on, bed in lowest/ locked position, hourly rounding in place, all needs met at this time.

## 2023-01-23 NOTE — DISCHARGE SUMMARY
Discharge Summary    DATE OF ADMISSION: 1/15/2023    DATE OF DISCHARGE: 1/25/2023    DISCHARGE DIAGNOSIS:  Perforated appendicitis    CONSULTATIONS:  Dr. Chrissy Yusuf, infectious disease    PROCEDURES:  Laparoscopic appendectomy by Dr. Heidi Dee on 1/15/2023.    BRIEF HPI and HOSPITAL COURSE:  Patient presented to the emergency department for further evaluation of abdominal pain.  Imaging was consistent with a perforated appendicitis.  He was taken to the operative suite where he underwent the above listed procedure.  He was continued on antibiotics.  He did have postoperative ileus which was managed with bowel rest and did resolve.  Infectious disease was consulted and antibiotics adjusted recommendations.  His BOLIVAR drain was able to be removed.  He is currently tolerating room air and a regular diet.  He is ambulating independently reporting adequate pain control.  His port and previous drain sites are healing as expected.  On the day of discharge he did have an elevated white blood cell count but was afebrile and nontoxic in appearance.  A repeat CT was reassuring and the patient was discharged home on an additional 10-day course of antibiotics.    DISPOSITION: Discharged home on 1/25/2023. The patient and family were counseled and questions were answered. Specifically, signs and symptoms of infection, respiratory decompensation, and persistent or worsening pain were discussed and the patient agrees to seek medical attention if any of these develop.    DISCHARGE MEDICATIONS:     Medication List        START taking these medications        Instructions   acetaminophen 325 MG Tabs  Commonly known as: Tylenol   Take 2 Tablets by mouth every 6 hours as needed.  Dose: 650 mg     levoFLOXacin 750 MG tablet  Commonly known as: LEVAQUIN   Take 1 Tablet by mouth every day for 10 days.  Dose: 750 mg     metroNIDAZOLE 500 MG Tabs  Commonly known as: FLAGYL   Take 1 Tablet by mouth every 8 hours for 10 days.  Dose:  500 mg            CONTINUE taking these medications        Instructions   Advil 200 MG Tabs  Generic drug: ibuprofen   Take 400 mg by mouth every 6 hours as needed for Mild Pain. 2 tablets = 400 mg.  Dose: 400 mg     Ambien 10 MG Tabs  Generic drug: zolpidem   Take 10 mg by mouth at bedtime as needed for Sleep.  Dose: 10 mg     Magnesium 100 MG Tabs   Take 100 mg by mouth every day.  Dose: 100 mg     multivitamin Tabs   Take 1 Tablet by mouth every day.  Dose: 1 Tablet     sumatriptan 100 MG tablet  Commonly known as: IMITREX   Take 50 mg by mouth 1 time a day as needed for Migraine. 0.5 tablet = 50 mg.  Dose: 50 mg              ACTIVITY:  No strenuous exercise or heavy lifting (more than 10 lbs) for 2 weeks.    WOUND CARE:  You may shower, but do not submerge in a bath for at least two weeks. If you have wound dressings, they may come off after 48 hours. If you have skin glue to the wound, this will fall off on its own, do not pick at it. If you have steri strips to the wound, these will fall off on their own, do not pick at them, may trim the edges if needed.      DIET:  Orders Placed This Encounter   Procedures    Diet Order Diet: Regular     Standing Status:   Standing     Number of Occurrences:   1     Order Specific Question:   Diet:     Answer:   Regular [1]       FOLLOW UP:  Heidi Dee M.D.  [address] [place] [id number]  [phone]    Follow up  As needed    Primary Care Provider    Schedule an appointment as soon as possible for a visit        TIME SPENT ON DISCHARGE: 35 minutes      ____________________________________________  AMY Charles    DD: 1/25/2023 11:27 AM

## 2023-01-23 NOTE — PROGRESS NOTES
"    DATE: 1/23/2023    Post Operative Day 7 laparoscopic appendectomy.    INTERVAL EVENTS:  Doing well.  Abdomen less distended. Soft and nontender.  BOLIVAR with 150 cc serosanguineous drainage.  No surgery at this time.    - Reglan added.  - Okay for full liquid diet. Advance as tolerated.  - Encourage ambulation and incentive spirometer.  Discharge planning in place.    REVIEW OF SYSTEMS:  Review of Systems   Constitutional:  Negative for chills, diaphoresis, fever and malaise/fatigue.   HENT: Negative.     Eyes: Negative.    Respiratory:  Negative for shortness of breath.    Cardiovascular: Negative.    Gastrointestinal:  Negative for abdominal pain, diarrhea, nausea and vomiting.        Last BM 1/22   Genitourinary:  Negative for dysuria.        Voiding   Musculoskeletal:  Negative for myalgias.   Skin: Negative.    Neurological: Negative.    Endo/Heme/Allergies: Negative.    Psychiatric/Behavioral: Negative.     All other systems reviewed and are negative.    PHYSICAL EXAMINATION:  Vital Signs: /79   Pulse 75   Temp 36.8 °C (98.2 °F) (Temporal)   Resp 14   Ht 1.803 m (5' 11\")   Wt 80.5 kg (177 lb 7.5 oz)   SpO2 96%   Physical Exam  Vitals and nursing note reviewed.   Constitutional:       General: He is awake. He is not in acute distress.     Appearance: He is not ill-appearing.   HENT:      Mouth/Throat:      Mouth: Mucous membranes are moist.      Pharynx: Oropharynx is clear.   Cardiovascular:      Rate and Rhythm: Normal rate.   Pulmonary:      Effort: Pulmonary effort is normal.   Abdominal:      General: There is distension (less distened.).      Palpations: Abdomen is soft.      Tenderness: There is no abdominal tenderness. There is no guarding.      Comments: BOLIVAR drain with serosang drainage   Musculoskeletal:         General: Normal range of motion.   Skin:     General: Skin is warm and dry.      Comments: BOLIVAR.  Surgical incisions well approximated, healing.   Neurological:      Mental Status: He " is alert and oriented to person, place, and time.   Psychiatric:         Mood and Affect: Mood normal.         Behavior: Behavior is cooperative.       LABORATORY VALUES:   Recent Labs     01/21/23  0214 01/22/23 0341 01/23/23  0455   WBC 12.0* 12.0* 10.9*   RBC 4.28* 4.89 4.29*   HEMOGLOBIN 12.8* 14.5 12.7*   HEMATOCRIT 37.9* 43.5 37.9*   MCV 88.6 89.0 88.3   MCH 29.9 29.7 29.6   MCHC 33.8 33.3* 33.5*   RDW 43.5 44.2 43.7   PLATELETCT 277 370 359   MPV 9.3 9.1 8.9*     Recent Labs     01/21/23 0214 01/22/23 0341 01/23/23 0455   SODIUM 136 139 139   POTASSIUM 3.5* 3.3* 3.7   CHLORIDE 98 100 103   CO2 27 27 26   GLUCOSE 110* 104* 105*   BUN 14 9 8   CREATININE 0.68 0.74 0.79   CALCIUM 8.4* 8.8 8.3*                IMAGING:   CT-ABDOMEN-PELVIS WITH   Final Result      1.  Interval operative intervention with a new surgical drain in the periappendiceal region and the appendix appears shorter with vascular clips in the region. This suggests partial appendectomy with appendiceal stump containing at least 2 residual    appendicoliths      2.  No abscess is confirmed but there is a minimal free fluid in the pelvis and right paracolic gutter region      3.  New small-moderate right, small left pleural effusions and basilar atelectasis      [id number] VIEW   Final Result      1.  Air-filled loops of small bowel in the mid and lower abdomen minimally dilated proximally but normal in caliber distally. This is likely postoperative ileus.   2.  There are postoperative changes in the right lower quadrant.      CT-ABDOMEN-PELVIS WITH   Final Result         1.  Findings compatible with acute appendicitis      These findings were discussed with the patient's clinician, Han Hinton, on 1/15/2023 8:41 PM.                  DVT Prophylaxis: Enoxaparin (Lovenox)            ASSESSMENT AND PLAN:   Acute appendicitis with perforation and localized peritonitis, without abscess or gangrene- (present on admission)  Assessment &  Plan  2 day hx of RLQ pain, WBC 21K, CT consistent with appendicitis  Status-post laparoscopic appendectomy 1/15.  BOLIVAR drain to bulb suction.  Clear liquid diet until bowel function returns.  IV antibiotics until leukocytosis resolves.  1/18 Vomiting, abdominal distention.  - KUB with ileus. NPO sips with meds.  1/19 IV antibiotics (Rocephin and flagyl) changed to PO (Levaquin and Flagyl). Okay for sips of juice.  1/20 Slight increase in WBC. Advance to clear liquid diet.  - Repeat CBC at 1400.  1/21 Leukocytosis. ID consult requested.  - Zosyn restarted.  1/22 CT a/p with appendicoliths.  - NPO at midnight for possible surgery.  1/23 No surgery. Reglan added. BOLIVAR with 150 cc serosanguineous drainage.        Discussed patient condition with RN, Patient, and general surgery, Dr. Dee.

## 2023-01-23 NOTE — CARE PLAN
The patient is Stable - Low risk of patient condition declining or worsening    Shift Goals  Clinical Goals: Ambulation, BP monitoring, NPO  Patient Goals: Rest, find out about surgery tomorrow    Progress made toward(s) clinical / shift goals:    Pt NPO, ambulated, walked 3 laps before bed, BP stable  Problem: Knowledge Deficit - Standard  Goal: Patient and family/care givers will demonstrate understanding of plan of care, disease process/condition, diagnostic tests and medications  Outcome: Progressing     Problem: Pain - Standard  Goal: Alleviation of pain or a reduction in pain to the patient’s comfort goal  Outcome: Progressing       Patient is not progressing towards the following goals:

## 2023-01-23 NOTE — DISCHARGE INSTRUCTIONS
Post Operative Discharge Instructions:    1. DIET: Upon discharge from the hospital you may resume your normal preoperative diet. Depending on how you are feeling and whether you have nausea or not, you may wish to stay with a bland diet for the first few days. However, you can advance this as quickly as you feel ready.    2. ACTIVITIES: After discharge from the hospital, you may resume full routine activities. However, there should be no heavy lifting (greater than 10 pounds) and no strenuous activities until after your follow-up visit. Otherwise, routine activities of daily living are acceptable.    3. DRIVING: You may drive whenever you are off pain medications and are able to perform the activities needed to drive, i.e. turning, bending, twisting, etc.    4. BATHING: You may get the wound wet at any time after leaving the hospital. You may shower, but do not submerge in a bath for at least two weeks. If you have wound dressings, they may come off after 48 hours. If you have skin glue to the wound, this will fall off on its own, do not pick at it. If you have steri strips to the wound, these will fall off on their own, do not pick at them, may trim the edges if needed.    5. BOWEL FUNCTION: A few patients, after this operation, will develop either frequent or loose stools after meals. This usually corrects itself after a few days, to a few weeks. If this occurs, do not worry; it is not unusual and will resolve. Much more common than loose stools, is constipation. The combination of pain medication and decreased activity level can cause constipation in otherwise normal patients. If you feel this is occurring, take a laxative (Milk of Magnesia, Ex-Lax, Senokot, etc.) until the problem has resolved.    6. PAIN MEDICATION: You will be given a prescription for pain medication at discharge. Please take these as directed. It is important to remember not to take medications on an empty stomach as this may cause  nausea. You may also take over the counter acetaminophen and/or NSAIDS (ibuprofen, Aleve, Advil, Motrin) per the package instructions.     7.CALL IF YOU HAVE: (1) Fevers to more than 101F, (2) Unusual chest or leg pain, (3) Drainage or fluid from incision that may be foul smelling, increased tenderness or soreness at the wound or the wound edges are no longer together, redness or swelling at the incision site. Please do not hesitate to call with any other questions.    Laparoscopic Appendectomy, Adult, Care After  This sheet gives you information about how to care for yourself after your procedure. Your doctor may also give you more specific instructions. If you have problems or questions, contact your doctor.  What can I expect after the procedure?  After the procedure, it is common to have:  Little energy for normal activities.  Mild pain in the area where the cuts from surgery (incisions) were made.  Trouble pooping (constipation). This can be caused by:  Pain medicine.  A lack of activity.  Follow these instructions at home:  Medicines  Take over-the-counter and prescription medicines only as told by your doctor.  If you were prescribed an antibiotic medicine, take it as told by your doctor. Do not stop taking it even if you start to feel better.  Do not drive or use heavy machinery while taking prescription pain medicine.  Ask your doctor if the medicine you are taking can cause trouble pooping. You may need to take steps to prevent or treat trouble pooping:  Drink enough fluid to keep your pee (urine) pale yellow.  Take over-the-counter or prescription medicines.  Eat foods that are high in fiber. These include beans, whole grains, and fresh fruits and vegetables.  Limit foods that are high in fat and sugar. These include fried or sweet foods.  Incision care    Follow instructions from your doctor about how to take care of your cuts from surgery. Make sure you:  Wash your hands with soap and water before and  after you change your bandage (dressing). If you cannot use soap and water, use hand .  Change your bandage as told by your doctor.  Leave stitches (sutures), skin glue, or skin tape (adhesive) strips in place. They may need to stay in place for 2 weeks or longer. If tape strips get loose and curl up, you may trim the loose edges. Do not remove tape strips completely unless your doctor says it is okay.  Check your cuts from surgery every day for signs of infection. Check for:  Redness, swelling, or pain.  Fluid or blood.  Warmth.  Pus or a bad smell.  Bathing  Keep your cuts from surgery clean and dry. Clean them as told by your doctor. To do this:  Gently wash the cuts with soap and water.  Rinse the cuts with water to remove all soap.  Pat the cuts dry with a clean towel. Do not rub the cuts.  Do not take baths, swim, or use a hot tub for 2 weeks, or until your doctor says it is okay. You may take showers after 48 hours.  Activity    Do not drive for 24 hours if you were given a medicine to help you relax (sedative) during your procedure.  Rest after the procedure. Return to your normal activities as told by your doctor. Ask your doctor what activities are safe for you.  For 3 weeks, or for as long as told by your doctor:  Do not lift anything that is heavier than 10 lb (4.5 kg), or the limit that you are told.  Do not play contact sports.  General instructions  If you were sent home with a drain, follow instructions from your doctor on how to care for it.  Take deep breaths. This helps to keep your lungs from getting an infection (pneumonia).  Keep all follow-up visits as told by your doctor. This is important.  Contact a doctor if:  You have redness, swelling, or pain around a cut from surgery.  You have fluid or blood coming from a cut.  Your cut feels warm to the touch.  You have pus or a bad smell coming from a cut or a bandage.  The edges of a cut break open after the stitches have been taken  out.  You have pain in your shoulders that gets worse.  You feel dizzy or you pass out (faint).  You have shortness of breath.  You keep feeling sick to your stomach (nauseous).  You keep throwing up (vomiting).  You get watery poop (diarrhea) or you cannot control your poop.  You lose your appetite.  You have swelling or pain in your legs.  You get a rash.  Get help right away if:  You have a fever.  You have trouble breathing.  You have sharp pains in your chest.  Summary  After the procedure, it is common to have low energy, mild pain, and trouble pooping.  Infection is a common problem after this procedure. Follow your doctor's instructions about caring for yourself after the procedure.  Rest after the procedure. Return to your normal activities as told by your doctor.  Contact your doctor if you see signs of infection around your cuts from surgery, or you get short of breath. Get help right away if you have a fever, chest pain, or trouble breathing.  This information is not intended to replace advice given to you by your health care provider. Make sure you discuss any questions you have with your health care provider.  Document Released: 10/14/2010 Document Revised: 06/20/2019 Document Reviewed: 06/20/2019  [employer] Patient Education © 2020 [employer]

## 2023-01-24 LAB
ANION GAP SERPL CALC-SCNC: 10 MMOL/L (ref 7–16)
BASOPHILS # BLD AUTO: 0.7 % (ref 0–1.8)
BASOPHILS # BLD: 0.09 K/UL (ref 0–0.12)
BUN SERPL-MCNC: 9 MG/DL (ref 8–22)
CALCIUM SERPL-MCNC: 8.9 MG/DL (ref 8.5–10.5)
CHLORIDE SERPL-SCNC: 104 MMOL/L (ref 96–112)
CO2 SERPL-SCNC: 23 MMOL/L (ref 20–33)
CREAT SERPL-MCNC: 0.79 MG/DL (ref 0.5–1.4)
EKG IMPRESSION: NORMAL
EOSINOPHIL # BLD AUTO: 0.23 K/UL (ref 0–0.51)
EOSINOPHIL NFR BLD: 1.9 % (ref 0–6.9)
ERYTHROCYTE [DISTWIDTH] IN BLOOD BY AUTOMATED COUNT: 45 FL (ref 35.9–50)
GFR SERPLBLD CREATININE-BSD FMLA CKD-EPI: 96 ML/MIN/1.73 M 2
GLUCOSE SERPL-MCNC: 97 MG/DL (ref 65–99)
HCT VFR BLD AUTO: 41.7 % (ref 42–52)
HGB BLD-MCNC: 13.7 G/DL (ref 14–18)
IMM GRANULOCYTES # BLD AUTO: 0.58 K/UL (ref 0–0.11)
IMM GRANULOCYTES NFR BLD AUTO: 4.8 % (ref 0–0.9)
LYMPHOCYTES # BLD AUTO: 1.22 K/UL (ref 1–4.8)
LYMPHOCYTES NFR BLD: 10.1 % (ref 22–41)
MCH RBC QN AUTO: 29.8 PG (ref 27–33)
MCHC RBC AUTO-ENTMCNC: 32.9 G/DL (ref 33.7–35.3)
MCV RBC AUTO: 90.7 FL (ref 81.4–97.8)
MONOCYTES # BLD AUTO: 0.81 K/UL (ref 0–0.85)
MONOCYTES NFR BLD AUTO: 6.7 % (ref 0–13.4)
NEUTROPHILS # BLD AUTO: 9.17 K/UL (ref 1.82–7.42)
NEUTROPHILS NFR BLD: 75.8 % (ref 44–72)
NRBC # BLD AUTO: 0 K/UL
NRBC BLD-RTO: 0 /100 WBC
PLATELET # BLD AUTO: 375 K/UL (ref 164–446)
PMV BLD AUTO: 8.7 FL (ref 9–12.9)
POTASSIUM SERPL-SCNC: 4.4 MMOL/L (ref 3.6–5.5)
RBC # BLD AUTO: 4.6 M/UL (ref 4.7–6.1)
SODIUM SERPL-SCNC: 137 MMOL/L (ref 135–145)
WBC # BLD AUTO: 12.1 K/UL (ref 4.8–10.8)

## 2023-01-24 PROCEDURE — 85025 COMPLETE CBC W/AUTO DIFF WBC: CPT

## 2023-01-24 PROCEDURE — A9270 NON-COVERED ITEM OR SERVICE: HCPCS

## 2023-01-24 PROCEDURE — 700102 HCHG RX REV CODE 250 W/ 637 OVERRIDE(OP): Performed by: REGISTERED NURSE

## 2023-01-24 PROCEDURE — 93010 ELECTROCARDIOGRAM REPORT: CPT | Performed by: INTERNAL MEDICINE

## 2023-01-24 PROCEDURE — 700102 HCHG RX REV CODE 250 W/ 637 OVERRIDE(OP): Performed by: SURGERY

## 2023-01-24 PROCEDURE — 80048 BASIC METABOLIC PNL TOTAL CA: CPT

## 2023-01-24 PROCEDURE — 700111 HCHG RX REV CODE 636 W/ 250 OVERRIDE (IP): Performed by: INTERNAL MEDICINE

## 2023-01-24 PROCEDURE — 700105 HCHG RX REV CODE 258: Performed by: INTERNAL MEDICINE

## 2023-01-24 PROCEDURE — A9270 NON-COVERED ITEM OR SERVICE: HCPCS | Performed by: REGISTERED NURSE

## 2023-01-24 PROCEDURE — 700111 HCHG RX REV CODE 636 W/ 250 OVERRIDE (IP)

## 2023-01-24 PROCEDURE — 770001 HCHG ROOM/CARE - MED/SURG/GYN PRIV*

## 2023-01-24 PROCEDURE — 93005 ELECTROCARDIOGRAM TRACING: CPT

## 2023-01-24 PROCEDURE — 99024 POSTOP FOLLOW-UP VISIT: CPT

## 2023-01-24 PROCEDURE — A9270 NON-COVERED ITEM OR SERVICE: HCPCS | Performed by: SURGERY

## 2023-01-24 PROCEDURE — 700102 HCHG RX REV CODE 250 W/ 637 OVERRIDE(OP)

## 2023-01-24 RX ORDER — METRONIDAZOLE 500 MG/1
500 TABLET ORAL EVERY 8 HOURS
Status: DISCONTINUED | OUTPATIENT
Start: 2023-01-24 | End: 2023-01-25 | Stop reason: HOSPADM

## 2023-01-24 RX ORDER — LEVOFLOXACIN 500 MG/1
750 TABLET, FILM COATED ORAL DAILY
Status: DISCONTINUED | OUTPATIENT
Start: 2023-01-24 | End: 2023-01-25 | Stop reason: HOSPADM

## 2023-01-24 RX ADMIN — METOCLOPRAMIDE 10 MG: 5 INJECTION, SOLUTION INTRAMUSCULAR; INTRAVENOUS at 11:31

## 2023-01-24 RX ADMIN — PIPERACILLIN AND TAZOBACTAM 4.5 G: 4; .5 INJECTION, POWDER, LYOPHILIZED, FOR SOLUTION INTRAVENOUS; PARENTERAL at 05:12

## 2023-01-24 RX ADMIN — METRONIDAZOLE 500 MG: 500 TABLET ORAL at 14:02

## 2023-01-24 RX ADMIN — TIZANIDINE 4 MG: 4 TABLET ORAL at 23:20

## 2023-01-24 RX ADMIN — METOCLOPRAMIDE 10 MG: 5 INJECTION, SOLUTION INTRAMUSCULAR; INTRAVENOUS at 05:09

## 2023-01-24 RX ADMIN — DOCUSATE SODIUM 100 MG: 100 CAPSULE, LIQUID FILLED ORAL at 05:09

## 2023-01-24 RX ADMIN — LISINOPRIL 10 MG: 10 TABLET ORAL at 05:09

## 2023-01-24 RX ADMIN — POTASSIUM CHLORIDE 20 MEQ: 1500 TABLET, EXTENDED RELEASE ORAL at 05:09

## 2023-01-24 RX ADMIN — METOCLOPRAMIDE 10 MG: 5 INJECTION, SOLUTION INTRAMUSCULAR; INTRAVENOUS at 17:08

## 2023-01-24 RX ADMIN — METOCLOPRAMIDE 10 MG: 5 INJECTION, SOLUTION INTRAMUSCULAR; INTRAVENOUS at 23:20

## 2023-01-24 RX ADMIN — LEVOFLOXACIN 750 MG: 500 TABLET, FILM COATED ORAL at 11:57

## 2023-01-24 RX ADMIN — DOCUSATE SODIUM 100 MG: 100 CAPSULE, LIQUID FILLED ORAL at 17:08

## 2023-01-24 RX ADMIN — METRONIDAZOLE 500 MG: 500 TABLET ORAL at 21:59

## 2023-01-24 RX ADMIN — ENOXAPARIN SODIUM 40 MG: 40 INJECTION SUBCUTANEOUS at 17:08

## 2023-01-24 RX ADMIN — TAMSULOSIN HYDROCHLORIDE 0.4 MG: 0.4 CAPSULE ORAL at 07:57

## 2023-01-24 RX ADMIN — SENNOSIDES AND DOCUSATE SODIUM 1 TABLET: 50; 8.6 TABLET ORAL at 21:59

## 2023-01-24 RX ADMIN — POTASSIUM CHLORIDE 20 MEQ: 1500 TABLET, EXTENDED RELEASE ORAL at 11:31

## 2023-01-24 ASSESSMENT — ENCOUNTER SYMPTOMS
DIARRHEA: 0
PSYCHIATRIC NEGATIVE: 1
DIAPHORESIS: 0
NAUSEA: 0
EYES NEGATIVE: 1
CHILLS: 0
CARDIOVASCULAR NEGATIVE: 1
VOMITING: 0
RESPIRATORY NEGATIVE: 1
MYALGIAS: 0
NEUROLOGICAL NEGATIVE: 1
ABDOMINAL PAIN: 0
ROS GI COMMENTS: LAST BM 1/23
FEVER: 0

## 2023-01-24 ASSESSMENT — PAIN DESCRIPTION - PAIN TYPE
TYPE: ACUTE PAIN
TYPE: ACUTE PAIN;SURGICAL PAIN
TYPE: ACUTE PAIN
TYPE: ACUTE PAIN

## 2023-01-24 NOTE — DISCHARGE PLANNING
Care Transition Team Assessment    LSW met with patient at bedside. Patient verified accuracy of the demographic information in the Facesheet.    Patient is a 67 year old, male, resident of [place] who became ill while vacationing with his wife in [place].    Patient's wife is Juanis [phone]. Patient reports his wife will drive him back to [place].    Patient's PCP is Dr. Jaylene Black with [facility] in [place], [place].    Patient reports he is independent with ambulation and anticipates returning home with no post acute needs.    Information Source  Orientation Level: Oriented X4  Information Given By: Patient  Informant's Name: Clinton  Who is responsible for making decisions for patient? : Patient    Readmission Evaluation  Is this a readmission?: No    Elopement Risk  Legal Hold: No  Ambulatory or Self Mobile in Wheelchair: Yes  Disoriented: No  Psychiatric Symptoms: None  History of Wandering: No  Elopement this Admit: No  Vocalizing Wanting to Leave: No  Displays Behaviors, Body Language Wanting to Leave: No-Not at Risk for Elopement  Elopement Risk: Not at Risk for Elopement    Interdisciplinary Discharge Planning  Lives with - Patient's Self Care Capacity: Spouse  Patient or legal guardian wants to designate a caregiver: No  Support Systems: Spouse / Significant Other  Housing / Facility: 2 Story House  Durable Medical Equipment: Not Applicable    Discharge Preparedness  What is your plan after discharge?: Home with help  What are your discharge supports?: Spouse         Finances  Financial Barriers to Discharge: No  Prescription Coverage: Yes    Vision / Hearing Impairment  Vision Impairment : No  Hearing Impairment : No         Advance Directive  Advance Directive?: None  Advance Directive offered?: AD Booklet refused    Domestic Abuse  Have you ever been the victim of abuse or violence?: No  Physical Abuse or Sexual Abuse: No  Verbal Abuse or Emotional Abuse: No  Possible Abuse/Neglect Reported to:: Not  Applicable    Psychological Assessment  History of Substance Abuse: None  History of Psychiatric Problems: No    Discharge Risks or Barriers  Discharge risks or barriers?: No    Anticipated Discharge Information  Discharge Disposition: Discharged to home/self care (01)  Discharge Address: [address]

## 2023-01-24 NOTE — CARE PLAN
The patient is Stable - Low risk of patient condition declining or worsening    Shift Goals  Clinical Goals: oob activity, IV abx  Patient Goals: rest, OOB activity    Progress made toward(s) clinical / shift goals:  Patient ambulating frequently. IV abx running through PIV.       Problem: Knowledge Deficit - Standard  Goal: Patient and family/care givers will demonstrate understanding of plan of care, disease process/condition, diagnostic tests and medications  Outcome: Progressing     Problem: Pain - Standard  Goal: Alleviation of pain or a reduction in pain to the patient’s comfort goal  Outcome: Progressing

## 2023-01-24 NOTE — PROGRESS NOTES
Pt AO x 4  Vital signs stable   Pt denies chest pain or SOB  O2 sat >90% on RA breathing unlabored  Pt denies pain.   Pt denies N/V/D  BOLIVAR drain with serosanguinous output.   + voiding, + flatus, + BM 1/22, bowel sounds normoactive x 4, abd soft, less distended than previous assessments   Pt ambulates self (12 laps around the unit today)  SCDs off, pt ambulating frequently     Updated plan of care discussed with pt. Safety education done. Falls precautions in place.   Pt safety maintained. Hourly rounding done.

## 2023-01-24 NOTE — PROGRESS NOTES
"    DATE: 1/24/2023    Post Operative Day 8 laparoscopic appendectomy.    INTERVAL EVENTS:  Slight increase in WBC.  Abdomen soft, nontender and not distended.  Tolerating regular diet.    -Discontinue BOLIVAR drain.  -Stop IV antibiotics and transition to p.o.  -CBC tomorrow morning.  -Incentive spirometer.  Discharge planning in place.    REVIEW OF SYSTEMS:  Review of Systems   Constitutional:  Negative for chills, diaphoresis, fever and malaise/fatigue.   HENT: Negative.     Eyes: Negative.    Respiratory: Negative.     Cardiovascular: Negative.    Gastrointestinal:  Negative for abdominal pain, diarrhea, nausea and vomiting.        Last BM 1/23   Genitourinary:  Negative for dysuria.        Voiding   Musculoskeletal:  Negative for myalgias.   Skin: Negative.    Neurological: Negative.    Endo/Heme/Allergies: Negative.    Psychiatric/Behavioral: Negative.     All other systems reviewed and are negative.    PHYSICAL EXAMINATION:  Vital Signs: BP (!) 150/92   Pulse 72   Temp 36.6 °C (97.9 °F) (Temporal)   Resp 18   Ht 1.803 m (5' 11\")   Wt 80.5 kg (177 lb 7.5 oz)   SpO2 92%   Physical Exam  Vitals and nursing note reviewed.   Constitutional:       General: He is awake. He is not in acute distress.     Appearance: He is not ill-appearing.   HENT:      Mouth/Throat:      Mouth: Mucous membranes are moist.      Pharynx: Oropharynx is clear.   Cardiovascular:      Rate and Rhythm: Normal rate.   Pulmonary:      Effort: Pulmonary effort is normal.   Abdominal:      General: There is no distension.      Palpations: Abdomen is soft.      Tenderness: There is no abdominal tenderness. There is no guarding.      Comments: BOLIVAR drain with serosang drainage   Musculoskeletal:         General: Normal range of motion.   Skin:     General: Skin is warm and dry.      Comments: BOLIVAR.  Surgical incisions well approximated, healing.   Neurological:      Mental Status: He is alert and oriented to person, place, and time.   Psychiatric:   "       Mood and Affect: Mood normal.         Behavior: Behavior is cooperative.       LABORATORY VALUES:   Recent Labs     01/22/23  0341 01/23/23  0455 01/24/23  0746   WBC 12.0* 10.9* 12.1*   RBC 4.89 4.29* 4.60*   HEMOGLOBIN 14.5 12.7* 13.7*   HEMATOCRIT 43.5 37.9* 41.7*   MCV 89.0 88.3 90.7   MCH 29.7 29.6 29.8   MCHC 33.3* 33.5* 32.9*   RDW 44.2 43.7 45.0   PLATELETCT 370 359 375   MPV 9.1 8.9* 8.7*     Recent Labs     01/22/23  0341 01/23/23  0455 01/24/23  0746   SODIUM 139 139 137   POTASSIUM 3.3* 3.7 4.4   CHLORIDE 100 103 104   CO2 27 26 23   GLUCOSE 104* 105* 97   BUN 9 8 9   CREATININE 0.74 0.79 0.79   CALCIUM 8.8 8.3* 8.9                IMAGING:   CT-ABDOMEN-PELVIS WITH   Final Result      1.  Interval operative intervention with a new surgical drain in the periappendiceal region and the appendix appears shorter with vascular clips in the region. This suggests partial appendectomy with appendiceal stump containing at least 2 residual    appendicoliths      2.  No abscess is confirmed but there is a minimal free fluid in the pelvis and right paracolic gutter region      3.  New small-moderate right, small left pleural effusions and basilar atelectasis      [id number] VIEW   Final Result      1.  Air-filled loops of small bowel in the mid and lower abdomen minimally dilated proximally but normal in caliber distally. This is likely postoperative ileus.   2.  There are postoperative changes in the right lower quadrant.      CT-ABDOMEN-PELVIS WITH   Final Result         1.  Findings compatible with acute appendicitis      These findings were discussed with the patient's clinician, Han Hinton, on 1/15/2023 8:41 PM.                  DVT Prophylaxis: Enoxaparin (Lovenox)            ASSESSMENT AND PLAN:   Acute appendicitis with perforation and localized peritonitis, without abscess or gangrene- (present on admission)  Assessment & Plan  2 day hx of RLQ pain, WBC 21K, CT consistent with  appendicitis  Status-post laparoscopic appendectomy 1/15.  BOLIVAR drain to bulb suction.  Clear liquid diet until bowel function returns.  IV antibiotics until leukocytosis resolves.  1/18 Vomiting, abdominal distention.  - KUB with ileus. NPO sips with meds.  1/19 IV antibiotics (Rocephin and flagyl) changed to PO (Levaquin and Flagyl). Okay for sips of juice.  1/20 Slight increase in WBC. Advance to clear liquid diet.  - Repeat CBC at 1400.  1/21 Leukocytosis. ID consult requested.  - Zosyn restarted.  1/22 CT a/p with appendicoliths.  - NPO at midnight for possible surgery.  1/23 No surgery. Reglan added. BOLIVAR with 150 cc serosanguineous drainage.  1/24 Slight increase in WBC. Transition to PO (Levaquin and Flagyl). Discontinue BOLIVAR.  - CBC am.        Discussed patient condition with RN, Patient, and general surgery, Dr. Dee.

## 2023-01-24 NOTE — PROGRESS NOTES
Received report from previous shift RN  Assessment complete.  A&O x 4. Patient calls appropriately.  Patient ambulates up self.   Patient denies pain.   Denies N&V. Tolerating regular diet.  X2 lap sites to abdomen with BOLIVAR drain, CDI.  + void, + flatus, + BM.  Patient denies SOB. Spo2>95% on room air.  Reviewed plan of care with patient. Call light and personal belongings with in reach. Hourly rounding in place. All needs met at this time.

## 2023-01-25 ENCOUNTER — PHARMACY VISIT (OUTPATIENT)
Dept: PHARMACY | Facility: MEDICAL CENTER | Age: 68
End: 2023-01-25
Payer: COMMERCIAL

## 2023-01-25 ENCOUNTER — APPOINTMENT (OUTPATIENT)
Dept: RADIOLOGY | Facility: MEDICAL CENTER | Age: 68
DRG: 339 | End: 2023-01-25
Attending: NURSE PRACTITIONER
Payer: MEDICARE

## 2023-01-25 VITALS
RESPIRATION RATE: 16 BRPM | TEMPERATURE: 99.1 F | HEIGHT: 71 IN | SYSTOLIC BLOOD PRESSURE: 143 MMHG | DIASTOLIC BLOOD PRESSURE: 96 MMHG | OXYGEN SATURATION: 97 % | WEIGHT: 177.47 LBS | HEART RATE: 81 BPM | BODY MASS INDEX: 24.85 KG/M2

## 2023-01-25 PROBLEM — Z78.9 NO CONTRAINDICATION TO DEEP VEIN THROMBOSIS (DVT) PROPHYLAXIS: Status: ACTIVE | Noted: 2023-01-25

## 2023-01-25 LAB
BASOPHILS # BLD AUTO: 0.6 % (ref 0–1.8)
BASOPHILS # BLD: 0.09 K/UL (ref 0–0.12)
EOSINOPHIL # BLD AUTO: 0.16 K/UL (ref 0–0.51)
EOSINOPHIL NFR BLD: 1.1 % (ref 0–6.9)
ERYTHROCYTE [DISTWIDTH] IN BLOOD BY AUTOMATED COUNT: 43.8 FL (ref 35.9–50)
HCT VFR BLD AUTO: 38.9 % (ref 42–52)
HGB BLD-MCNC: 12.9 G/DL (ref 14–18)
IMM GRANULOCYTES # BLD AUTO: 0.46 K/UL (ref 0–0.11)
IMM GRANULOCYTES NFR BLD AUTO: 3 % (ref 0–0.9)
LYMPHOCYTES # BLD AUTO: 1.34 K/UL (ref 1–4.8)
LYMPHOCYTES NFR BLD: 8.9 % (ref 22–41)
MCH RBC QN AUTO: 29.7 PG (ref 27–33)
MCHC RBC AUTO-ENTMCNC: 33.2 G/DL (ref 33.7–35.3)
MCV RBC AUTO: 89.4 FL (ref 81.4–97.8)
MONOCYTES # BLD AUTO: 0.86 K/UL (ref 0–0.85)
MONOCYTES NFR BLD AUTO: 5.7 % (ref 0–13.4)
NEUTROPHILS # BLD AUTO: 12.18 K/UL (ref 1.82–7.42)
NEUTROPHILS NFR BLD: 80.7 % (ref 44–72)
NRBC # BLD AUTO: 0 K/UL
NRBC BLD-RTO: 0 /100 WBC
PLATELET # BLD AUTO: 389 K/UL (ref 164–446)
PMV BLD AUTO: 8.8 FL (ref 9–12.9)
RBC # BLD AUTO: 4.35 M/UL (ref 4.7–6.1)
WBC # BLD AUTO: 15.1 K/UL (ref 4.8–10.8)

## 2023-01-25 PROCEDURE — 700102 HCHG RX REV CODE 250 W/ 637 OVERRIDE(OP): Performed by: SURGERY

## 2023-01-25 PROCEDURE — 74177 CT ABD & PELVIS W/CONTRAST: CPT

## 2023-01-25 PROCEDURE — 85025 COMPLETE CBC W/AUTO DIFF WBC: CPT

## 2023-01-25 PROCEDURE — 700111 HCHG RX REV CODE 636 W/ 250 OVERRIDE (IP)

## 2023-01-25 PROCEDURE — 99233 SBSQ HOSP IP/OBS HIGH 50: CPT | Performed by: INTERNAL MEDICINE

## 2023-01-25 PROCEDURE — RXMED WILLOW AMBULATORY MEDICATION CHARGE: Performed by: NURSE PRACTITIONER

## 2023-01-25 PROCEDURE — 99024 POSTOP FOLLOW-UP VISIT: CPT | Performed by: NURSE PRACTITIONER

## 2023-01-25 PROCEDURE — A9270 NON-COVERED ITEM OR SERVICE: HCPCS | Performed by: SURGERY

## 2023-01-25 PROCEDURE — A9270 NON-COVERED ITEM OR SERVICE: HCPCS

## 2023-01-25 PROCEDURE — 700102 HCHG RX REV CODE 250 W/ 637 OVERRIDE(OP)

## 2023-01-25 PROCEDURE — 700117 HCHG RX CONTRAST REV CODE 255: Performed by: NURSE PRACTITIONER

## 2023-01-25 RX ORDER — METRONIDAZOLE 500 MG/1
500 TABLET ORAL EVERY 8 HOURS
Qty: 30 TABLET | Refills: 0 | Status: ACTIVE | OUTPATIENT
Start: 2023-01-25 | End: 2023-02-04

## 2023-01-25 RX ORDER — OXYCODONE HYDROCHLORIDE 5 MG/1
2.5 TABLET ORAL
Status: DISCONTINUED | OUTPATIENT
Start: 2023-01-25 | End: 2023-01-25 | Stop reason: HOSPADM

## 2023-01-25 RX ORDER — METRONIDAZOLE 500 MG/1
500 TABLET ORAL EVERY 8 HOURS
Qty: 15 TABLET | Refills: 0 | Status: SHIPPED | OUTPATIENT
Start: 2023-01-25 | End: 2023-01-25 | Stop reason: SDUPTHER

## 2023-01-25 RX ORDER — ACETAMINOPHEN 325 MG/1
650 TABLET ORAL EVERY 6 HOURS PRN
Qty: 30 TABLET | Refills: 0 | COMMUNITY
Start: 2023-01-25

## 2023-01-25 RX ORDER — OXYCODONE HYDROCHLORIDE 5 MG/1
5 TABLET ORAL
Status: DISCONTINUED | OUTPATIENT
Start: 2023-01-25 | End: 2023-01-25 | Stop reason: HOSPADM

## 2023-01-25 RX ORDER — LEVOFLOXACIN 750 MG/1
750 TABLET, FILM COATED ORAL DAILY
Qty: 10 TABLET | Refills: 0 | Status: ACTIVE | OUTPATIENT
Start: 2023-01-25 | End: 2023-02-04

## 2023-01-25 RX ORDER — ACETAMINOPHEN 325 MG/1
650 TABLET ORAL EVERY 6 HOURS PRN
Status: DISCONTINUED | OUTPATIENT
Start: 2023-01-25 | End: 2023-01-25 | Stop reason: HOSPADM

## 2023-01-25 RX ORDER — LEVOFLOXACIN 750 MG/1
750 TABLET, FILM COATED ORAL DAILY
Qty: 5 TABLET | Refills: 0 | Status: SHIPPED | OUTPATIENT
Start: 2023-01-25 | End: 2023-01-25 | Stop reason: SDUPTHER

## 2023-01-25 RX ADMIN — METOCLOPRAMIDE 10 MG: 5 INJECTION, SOLUTION INTRAMUSCULAR; INTRAVENOUS at 05:01

## 2023-01-25 RX ADMIN — LISINOPRIL 10 MG: 10 TABLET ORAL at 05:01

## 2023-01-25 RX ADMIN — IOHEXOL 100 ML: 350 INJECTION, SOLUTION INTRAVENOUS at 10:53

## 2023-01-25 RX ADMIN — METRONIDAZOLE 500 MG: 500 TABLET ORAL at 05:01

## 2023-01-25 RX ADMIN — DOCUSATE SODIUM 100 MG: 100 CAPSULE, LIQUID FILLED ORAL at 05:01

## 2023-01-25 ASSESSMENT — ENCOUNTER SYMPTOMS
NERVOUS/ANXIOUS: 0
CONSTIPATION: 0
SHORTNESS OF BREATH: 0
MYALGIAS: 0
ABDOMINAL PAIN: 0
WEAKNESS: 0
NAUSEA: 0
COUGH: 0
VOMITING: 0
DIARRHEA: 0

## 2023-01-25 ASSESSMENT — PAIN DESCRIPTION - PAIN TYPE: TYPE: ACUTE PAIN;SURGICAL PAIN

## 2023-01-25 NOTE — CARE PLAN
The patient is Stable - Low risk of patient condition declining or worsening    Shift Goals  Clinical Goals: Rest, WBC  Patient Goals: Rest    Progress made toward(s) clinical / shift goals:  Pt resting. WBC trending, labs due at 0300.     Patient is not progressing towards the following goals:

## 2023-01-25 NOTE — PROGRESS NOTES
"  DATE: 1/25/2023    1/15 Laparoscopic appendectomy.    INTERVAL EVENTS:  Feeling good, tolerating room air and oral diet, voiding without issue, having regular BMs.  WBC trend up 15.1, afebrile, nontoxic apperance.    PHYSICAL EXAMINATION:  Vital Signs: /84   Pulse 74   Temp 37.3 °C (99.1 °F) (Temporal)   Resp 16   Ht 1.803 m (5' 11\")   Wt 80.5 kg (177 lb 7.5 oz)   SpO2 94%     LABORATORY VALUES:   Recent Labs     01/23/23  0455 01/24/23  0746 01/25/23  0143   WBC 10.9* 12.1* 15.1*   RBC 4.29* 4.60* 4.35*   HEMOGLOBIN 12.7* 13.7* 12.9*   HEMATOCRIT 37.9* 41.7* 38.9*   MCV 88.3 90.7 89.4   MCH 29.6 29.8 29.7   MCHC 33.5* 32.9* 33.2*   RDW 43.7 45.0 43.8   PLATELETCT 359 375 389   MPV 8.9* 8.7* 8.8*     Recent Labs     01/23/23  0455 01/24/23  0746   SODIUM 139 137   POTASSIUM 3.7 4.4   CHLORIDE 103 104   CO2 26 23   GLUCOSE 105* 97   BUN 8 9   CREATININE 0.79 0.79   CALCIUM 8.3* 8.9                IMAGING:   CT-ABDOMEN-PELVIS WITH   Final Result      1.  Interval operative intervention with a new surgical drain in the periappendiceal region and the appendix appears shorter with vascular clips in the region. This suggests partial appendectomy with appendiceal stump containing at least 2 residual    appendicoliths      2.  No abscess is confirmed but there is a minimal free fluid in the pelvis and right paracolic gutter region      3.  New small-moderate right, small left pleural effusions and basilar atelectasis      [id number] VIEW   Final Result      1.  Air-filled loops of small bowel in the mid and lower abdomen minimally dilated proximally but normal in caliber distally. This is likely postoperative ileus.   2.  There are postoperative changes in the right lower quadrant.      CT-ABDOMEN-PELVIS WITH   Final Result         1.  Findings compatible with acute appendicitis      These findings were discussed with the patient's clinician, Han Hinton, on 1/15/2023 8:41 PM.      CT-ABDOMEN-PELVIS WITH "    (Results Pending)       ASSESSMENT AND PLAN:   Acute appendicitis with perforation and localized peritonitis, without abscess or gangrene- (present on admission)  Assessment & Plan  2 day hx of RLQ pain, WBC 21K, CT consistent with appendicitis.  1/15 Laparoscopic appendectomy.  BOLIVAR drain to bulb suction.  Clear liquid diet until bowel function returns.  IV antibiotics until leukocytosis resolves.  1/18 Vomiting, abdominal distention.  - KUB with ileus. NPO sips with meds.  1/19 IV antibiotics (Rocephin and flagyl) changed to PO (Levaquin and Flagyl). Okay for sips of juice.  1/20 Slight increase in WBC. Advance to clear liquid diet.  1/21 Leukocytosis. ID consult requested. Zosyn restarted.  1/22 CT with appendicoliths.  1/23 Reglan added. BOLIVAR with 150 cc serosanguineous drainage.  1/24 Slight increase in WBC. Transition to PO (Levaquin and Flagyl). Discontinue BOLIVAR.  1/25 WBC 15.1. Repeat CT abd/pelvis ordered.  [facility].    No contraindication to deep vein thrombosis (DVT) prophylaxis- (present on admission)  Assessment & Plan  Prophylactic dose enoxaparin initiated upon admission.           ____________________________________     RONY Charles.    DD: 1/25/2023  8:49 AM

## 2023-01-25 NOTE — PROGRESS NOTES
Bedside report received from day shift nurse.  Pt A&Ox4  Tolerating regular diet, denies n/v. Normoactive bowel sounds, passing flatus, LBM 1/22/23. IV access through 20g RFA that is SL.  2x lap sites to abd, CDI. 1x old BOLIVAR site, CDI.   Saturating >90% on RA.  Pt ambulates independently.  Pain is controlled through medication orders. Updated on plan of care. Safety education provided. Bed locked in low. Call light within reach. Rounding in place.

## 2023-01-25 NOTE — DIETARY
Nutrition Services Brief Update:    Day 10 of admit.  Clinton Garcia is a 67 y.o. male with admitting DX of Perforated appendicitis, Perforated appendix    Current Diet: Regular, with oral nutrition supplements    Problem: Nutritional:  Goal: Achieve adequate nutritional intake  Description: Patient will consume 50% of meals, once diet advances past clear liquid  Outcome: Progressing as expected  Diet advanced to full liquid 1/23, Low Fiber (GI soft) 1/23 and Regular 1/24.  PO averaging 50% of meals per ADL flow sheet.   RD will continue to follow for consistent PO intake >50%.

## 2023-01-25 NOTE — PROGRESS NOTES
Infectious Disease Progress Note    Author: Chrissy Yusuf M.D. Date & Time of service: 2023  11:19 AM       Chief Complaint:  Polymicrobial bacteremia, perforated appendicitis     Interval History:   AF, O2 RA, drain output 155 cc in last 24 hours, has been slowly decreasing, still high. No new complaints today, no significant abdominal pain.     Review of Systems:  Review of Systems   Respiratory:  Negative for cough and shortness of breath.    Gastrointestinal:  Negative for abdominal pain, constipation, diarrhea, nausea and vomiting.   Genitourinary:  Negative for dysuria.   Musculoskeletal:  Negative for myalgias.   Neurological:  Negative for weakness.   Psychiatric/Behavioral:  The patient is not nervous/anxious.      Hemodynamics:  Temp (24hrs), Av.3 °C (99.2 °F), Min:37.3 °C (99.1 °F), Max:37.4 °C (99.3 °F)  Temperature: 37.3 °C (99.1 °F), Monitored Temp: 37.1 °C (98.8 °F)  Pulse  Av.5  Min: 68  Max: 103   Blood Pressure : (!) 143/96       Physical Exam:  Physical Exam  Cardiovascular:      Rate and Rhythm: Normal rate and regular rhythm.      Heart sounds: Normal heart sounds.   Pulmonary:      Effort: Pulmonary effort is normal.      Breath sounds: Normal breath sounds.   Abdominal:      General: There is distension.      Palpations: Abdomen is soft.      Tenderness: There is no abdominal tenderness.   Musculoskeletal:         General: Normal range of motion.   Skin:     General: Skin is warm and dry.   Neurological:      General: No focal deficit present.      Mental Status: He is oriented to person, place, and time.   Psychiatric:         Mood and Affect: Mood normal.         Behavior: Behavior normal.       Meds:    Current Facility-Administered Medications:     oxyCODONE immediate-release **OR** oxyCODONE immediate-release **OR** [DISCONTINUED] morphine injection    acetaminophen    levoFLOXacin    metroNIDAZOLE    metoclopramide    lisinopril    enoxaparin (LOVENOX)  injection    tizanidine    Respiratory Therapy Consult    Pharmacy Consult Request    ondansetron    docusate sodium    senna-docusate    senna-docusate    polyethylene glycol/lytes    magnesium hydroxide    bisacodyl    sodium phosphate    [COMPLETED] ketorolac **FOLLOWED BY** ibuprofen    Labs:  Recent Labs     01/23/23  0455 01/24/23  0746 01/25/23  0143   WBC 10.9* 12.1* 15.1*   RBC 4.29* 4.60* 4.35*   HEMOGLOBIN 12.7* 13.7* 12.9*   HEMATOCRIT 37.9* 41.7* 38.9*   MCV 88.3 90.7 89.4   MCH 29.6 29.8 29.7   RDW 43.7 45.0 43.8   PLATELETCT 359 375 389   MPV 8.9* 8.7* 8.8*   NEUTSPOLYS 79.30* 75.80* 80.70*   LYMPHOCYTES 10.30* 10.10* 8.90*   MONOCYTES 1.70 6.70 5.70   EOSINOPHILS 2.60 1.90 1.10   BASOPHILS 0.90 0.70 0.60   RBCMORPHOLO Present  --   --      Recent Labs     01/23/23  0455 01/24/23  0746   SODIUM 139 137   POTASSIUM 3.7 4.4   CHLORIDE 103 104   CO2 26 23   GLUCOSE 105* 97   BUN 8 9     Recent Labs     01/23/23  0455 01/24/23  0746   CREATININE 0.79 0.79       Imaging:  CT-ABDOMEN-PELVIS WITH    Result Date: 1/25/2023 1/25/2023 10:34 AM HISTORY/REASON FOR EXAM:  Abdominal pain, fever, post-op.  Elevated white blood cell count.  Recent appendectomy. TECHNIQUE/EXAM DESCRIPTION:   CT scan of the abdomen and pelvis with contrast. Contrast-enhanced helical scanning was obtained from the diaphragmatic domes through the pubic symphysis following the bolus administration of nonionic contrast without complication. 100 mL of Omnipaque 350 nonionic contrast was administered without complication. Low dose optimization technique was utilized for this CT exam including automated exposure control and adjustment of the mA and/or kV according to patient size. COMPARISON: 1/21/2023 FINDINGS: Lower Chest: Small bilateral pleural fluid collections with associated atelectasis, worse on the RIGHT. Liver: Unremarkable. Spleen: Unremarkable. Pancreas: Unremarkable. Gallbladder: No calcified stones. Biliary: Nondilated. Adrenal  glands: Normal. Kidneys: Small bilateral renal cortical cysts measuring up to 11 mm, for which no further evaluation is necessary. Bowel: Mild increased small bowel and colonic fluid.  No definite bowel obstruction.  Postoperative change of RIGHT lower quadrant consistent with prior appendectomy.  Inflammatory changes again seen in the RIGHT lower quadrant with small fluid collection tracking superiorly to the inferior tip of RIGHT lobe liver, decreased in size from prior exam, with peripheral enhancement.  Drain has been removed. Lymph nodes: No adenopathy. Vasculature: Unremarkable. Peritoneum: No pneumoperitoneum. Musculoskeletal: Degenerative change of lumbar spine. Pelvis: No adenopathy or free fluid. Edema and minimal gas in the periumbilical abdominal wall, likely postoperative.     1.  Inflammatory changes and small fluid collection again seen in the RIGHT lower quadrant at site of prior appendectomy, likely resolving abscess with interval removal of drainage catheter. 2.  Increased small bowel and colonic fluid, without definite obstruction 3.  No pneumoperitoneum.     CT-ABDOMEN-PELVIS WITH    Result Date: 1/21/2023 1/21/2023 3:41 PM HISTORY/REASON FOR EXAM:  leukocytosis, rule out abscess. Postoperative day 5 with leukocytosis, appendectomy. TECHNIQUE/EXAM DESCRIPTION:   CT scan of the abdomen and pelvis with contrast. Contrast-enhanced helical scanning was obtained from the diaphragmatic domes through the pubic symphysis following the bolus administration of nonionic contrast without complication. 95 mL of Omnipaque 350 nonionic contrast was administered without complication. Low dose optimization technique was utilized for this CT exam including automated exposure control and adjustment of the mA and/or kV according to patient size. COMPARISON: 1/15/2023 CT FINDINGS: Lower Chest: Moderate right, small left layering low-density pleural effusions. There is adjacent linear pulmonary opacity with volume  loss most consistent with atelectasis.. There has been interval operative procedure with vascular clips in the right paracolic gutter and a surgical drain adjacent to what appears to be an appendiceal stump. The entire length of the appendix is no longer seen and some of the appendicoliths are  no longer visualized. There are however at least 2 residual appendicoliths and there are adjacent vascular clips. The larger measures 6 mm. The appendix stump measures 13 mm Some fat stranding is seen in the region and its similar to comparison. There is no loculated fluid collection confirmed The remainder of the bowel is within normal limits without abnormal dilatation or increased stool volume. Liver: Normal. Spleen: Unremarkable. Pancreas: Unremarkable. Gallbladder: No calcified stones. Biliary: Nondilated. Adrenal glands: Normal. Kidneys: Right cortical hypodense mass most consistent with a cyst. This does not have features requiring follow-up. There is no hydronephrosis. Bowel: No obstruction or acute inflammation. Lymph nodes: No adenopathy. Vasculature: Unremarkable. Minimal atherosclerosis Musculoskeletal: No acute or destructive process. Pelvis: No adenopathy There is trace low-density free fluid. No rim-enhancing fluid collection to indicate abscess No prostatomegaly or abnormal bladder dilatation     1.  Interval operative intervention with a new surgical drain in the periappendiceal region and the appendix appears shorter with vascular clips in the region. This suggests partial appendectomy with appendiceal stump containing at least 2 residual appendicoliths 2.  No abscess is confirmed but there is a minimal free fluid in the pelvis and right paracolic gutter region 3.  New small-moderate right, small left pleural effusions and basilar atelectasis    CT-ABDOMEN-PELVIS WITH    Result Date: 1/15/2023    1/15/2023 8:07 PM HISTORY/REASON FOR EXAM:  RLQ abdominal pain (Age >= 14y); IV contrast only. TECHNIQUE/EXAM  DESCRIPTION:   CT scan of the abdomen and pelvis with contrast. Contrast-enhanced helical scanning was obtained from the diaphragmatic domes through the pubic symphysis following the bolus administration of nonionic contrast without complication. 100 mL of Omnipaque 350 nonionic contrast was administered without complication. Low dose optimization technique was utilized for this CT exam including automated exposure control and adjustment of the mA and/or kV according to patient size. COMPARISON: No prior studies available. FINDINGS: Lower Chest: Linear densities the bilateral lung bases favor changes of atelectasis. Liver: Normal. Spleen: Unremarkable. Pancreas: Unremarkable. Gallbladder: No calcified stones. Biliary: Nondilated. Adrenal glands: Normal. Kidneys: Bilateral small renal cysts are seen, otherwise unremarkable without hydronephrosis. Bowel: Multiple appendicoliths seen. The appendix is fluid-filled and dilated. Hazy fat stranding in the right lower quadrant is seen. Lymph nodes: No adenopathy. Vasculature: Unremarkable. Peritoneum: Unremarkable without ascites. Musculoskeletal: No acute or destructive process. Pelvis: No adenopathy or free fluid.     1.  Findings compatible with acute appendicitis These findings were discussed with the patient's clinician, Han Hinton, on 1/15/2023 8:41 PM.    [id number] VIEW    Result Date: 1/18/2023 1/18/2023 10:07 AM HISTORY/REASON FOR EXAM:  Abdominal Pain. History of recent appendectomy TECHNIQUE/EXAM DESCRIPTION AND NUMBER OF VIEWS:  1 view(s) of the abdomen. COMPARISON: CT scan 1/15/2023 FINDINGS: There is postoperative change in the right lower quadrant with surgical clips in a surgical drain in place. There are several air-filled loops of small bowel in the midabdomen which are mildly dilated measuring up to 4 cm in diameter. The more distal air-filled loops of bowel in the lower abdomen are normal in caliber. There is linear left lower lobe atelectasis.  "    1.  Air-filled loops of small bowel in the mid and lower abdomen minimally dilated proximally but normal in caliber distally. This is likely postoperative ileus. 2.  There are postoperative changes in the right lower quadrant.      Micro:  Results       Procedure Component Value Units Date/Time    BLOOD CULTURE [[id number]] Collected: 01/21/23 1258    Order Status: Completed Specimen: Blood from Peripheral Updated: 01/22/23 0739     Significant Indicator NEG     Source BLD     Site PERIPHERAL     Culture Result No Growth  Note: Blood cultures are incubated for 5 days and  are monitored continuously.Positive blood cultures  are called to the RN and reported as soon as  they are identified.      Narrative:      Per Hospital Policy: Only change Specimen Src: to \"Line\" if  specified by physician order.  Left AC    BLOOD CULTURE [[id number]] Collected: 01/21/23 1258    Order Status: Completed Specimen: Blood from Peripheral Updated: 01/22/23 0739     Significant Indicator NEG     Source BLD     Site PERIPHERAL     Culture Result No Growth  Note: Blood cultures are incubated for 5 days and  are monitored continuously.Positive blood cultures  are called to the RN and reported as soon as  they are identified.      Narrative:      Per Hospital Policy: Only change Specimen Src: to \"Line\" if  specified by physician order.  Left Hand    Blood Culture [[id number]] Collected: 01/15/23 2008    Order Status: Completed Specimen: Blood from Peripheral Updated: 01/20/23 2100     Significant Indicator NEG     Source BLD     Site PERIPHERAL     Culture Result No growth after 5 days of incubation.    Narrative:      2 of 2 blood culture x2  Sites order. Per Hospital Policy:  Only change Specimen Src: to \"Line\" if specified by physician  order.  Left AC    Blood Culture [[id number]]  (Abnormal)  (Susceptibility) Collected: 01/15/23 1950    Order Status: Completed Specimen: Blood from Peripheral Updated: 01/20/23 1312     Significant " "Indicator POS     Source BLD     Site PERIPHERAL     Culture Result Growth detected by Bactec instrument. 01/17/2023  01:06      Pseudomonas aeruginosa      Gram-positive pili  Hungatella aureliaradha  Organism identified by MALDI-TOF research use only library.        Bacteroides uniformis    Narrative:      CALL  [place]  141 tel. [phone],  CALLED  141 tel. [phone] 01/17/2023, 01:08, RB PERF. RESULTS CALLED TO: RN  [id number]  1 of 2 for Blood Culture x 2 sites order. Per Hospital  Policy: Only change Specimen Src: to \"Line\" if specified by  physician order.  No site indicated    Susceptibility       Pseudomonas aeruginosa (1)       Antibiotic Interpretation Microscan   Method Status    Ciprofloxacin Sensitive <=0.25 mcg/mL CHARLEY Final    Cefepime Sensitive <=2 mcg/mL CHARLEY Final    Amikacin Sensitive <=16 mcg/mL CHARLEY Final    Gentamicin Sensitive <=2 mcg/mL CHARLEY Final    Tobramycin Sensitive <=2 mcg/mL CHARLEY Final    Meropenem Sensitive <=1 mcg/mL CHARLEY Final    Pip/Tazobactam Sensitive <=8 mcg/mL CHARLEY Final                               Assessment:  Active Hospital Problems    Diagnosis     *Perforated appendicitis [K35.32]     No contraindication to deep vein thrombosis (DVT) prophylaxis [Z78.9]     Acute appendicitis with perforation and localized peritonitis, without abscess or gangrene [K35.32]     Perforated appendix [K35.32]      Interval 24 hours:      AF, O2 RA  Labs reviewed  Imaging personally reviewed both images and report.  Micro reviewed    Patient doing well today with no abdominal pain, drain is been removed.  Continued on oral antibiotics as below.      ASSESSMENT/PLAN:      67 y.o.  admitted 1/15/2023. Pt has a past medical history right lower quadrant abdominal pain. CT scan consistent with acute appendicitis. He went to the OR for laparoscopic appendectomy.  Per OR note 6 was perforated and there was purulent drainage within the abdomen.  No cultures obtained.  Blood cultures of 1/15 or 1/2 positive with " polymicrobial results.  Patient was treated with ceftriaxone and Flagyl from 1/15-1/19 and then transition to levofloxacin and Flagyl.      Problem List  Leukocytosis, ongoing since arrival, overall improved but increased on 1/21, stable today at 12  Bacteremia vs contaminant  -Blood cultures on 1/15 are 1/22 +Pseudomonas aeruginosa, bacteria species and Hungatella hatheway.  Polymicrobial infection and only 1 of 2 sets is concerning for contaminated sample.  However, also in the setting of perforated appendicitis.  -No repeat blood cultures.  Until 1/21, these are so far no growth to date  Perforated appendicitis and peritonitis  -Op report with perforate appendicitis and purulent fluid within the abdomen  -Still with high output from abdominal drain  -CT abdomen and pelvis with contrast 1/21 notes operative intervention with surgical drain in the periappendiceal region, appendix shorter suggesting partial appendectomy with appendiceal stump containing at least 2 residual appendicoliths.  No abscess minimal free fluid.  -Repeat CT abdomen pelvis on 1/25 again notes inflammatory changes and small fluid collections in the right lower quadrant at the site of prior appendectomy, likely resolving abscess with interval removal of drainage catheter  Pleural effusions, bilateral, small   -CT abdomen pelvis as above with finding of new small to moderate on right and small left pleural effusions with some basilar atelectasis.  -Small bilateral pleural effusion collections with atelectasis again noted on CT from 1/25    Plan      --- Patient transition to levofloxacin 750 mg daily and Flagyl 500 mg 3 times daily on 1/24 in anticipation of potential discharge today with drain removal, patient with increasing WBC over last 2 days and repeat CT was ordered with results as above.,  No significant new findings.    --- Continue levofloxacin and Flagyl as above for an additional 10 days   --- Discussed proper fluoroquinolone  management: medication should not be taken with multi vitamins.  Monitor blood sugars closely if diabetic. Discuss medication with health care professional if new joint or tendon pain. Seek medical attention if experience multiple watery bowel movements.   EKG on 1/24   QTc:430 (acceptable)   --- F/up repeat blood cultures, NGTD   --- Abdominal drain has been removed  --- Monitor labs   --- Recommend follow-up with surgery  --- Recommend that if the patient had increasing abdominal pain new drainage or new fevers that he again came into the ER        Dispo: Okay for discharge from ID perspective      PICC: No         Plan of care discussed with surgical NP Kayode. ID will sign off.       Chrissy Yusuf M.D.

## 2023-01-26 LAB
BACTERIA BLD CULT: NORMAL
BACTERIA BLD CULT: NORMAL
SIGNIFICANT IND 70042: NORMAL
SIGNIFICANT IND 70042: NORMAL
SITE SITE: NORMAL
SITE SITE: NORMAL
SOURCE SOURCE: NORMAL
SOURCE SOURCE: NORMAL

## (undated) DEVICE — SET EXTENSION WITH 2 PORTS (48EA/CA) ***PART #2C8610 IS A SUBSTITUTE*****

## (undated) DEVICE — GLOVE BIOGEL PI INDICATOR SZ 7.5 SURGICAL PF LF -(50/BX 4BX/CA)

## (undated) DEVICE — CANNULA W/SEAL 5X100 Z-THRE - ADED KII (12/BX)

## (undated) DEVICE — SENSOR OXIMETER ADULT SPO2 RD SET (20EA/BX)

## (undated) DEVICE — GLOVE BIOGEL ECLIPSE PF LATEX SIZE 6.0 (50PR/BX)

## (undated) DEVICE — DRAIN J-VAC 10MM FLAT - (10/CA)

## (undated) DEVICE — RESERVOIR SUCTION 100 CC - SILICONE (20EA/CA)

## (undated) DEVICE — NEEDLE INSFL 120MM 14GA VRRS - (20/BX)

## (undated) DEVICE — SUTURE GENERAL

## (undated) DEVICE — SET SUCTION/IRRIGATION WITH DISPOSABLE TIP (6/CA )PART #0250-070-520 IS A SUB

## (undated) DEVICE — TROCAR Z THREAD 12 X 100 - BLADED (6/BX)

## (undated) DEVICE — PAD GROUNDING BOVIE PEDS - (25/CA)

## (undated) DEVICE — CANISTER SUCTION 3000ML MECHANICAL FILTER AUTO SHUTOFF MEDI-VAC NONSTERILE LF DISP  (40EA/CA)

## (undated) DEVICE — COVER LIGHT HANDLE ALC PLUS DISP (18EA/BX)

## (undated) DEVICE — SET LEADWIRE 5 LEAD BEDSIDE DISPOSABLE ECG (1SET OF 5/EA)

## (undated) DEVICE — TROCARCANN&SEAL 5X55 ZTHREAD - 12/BX

## (undated) DEVICE — GLOVE BIOGEL SZ 7.5 SURGICAL PF LTX - (50PR/BX 4BX/CA)

## (undated) DEVICE — SUTURE 4-0 VICRYL PLUS FS-2 - 27 INCH (36/BX)

## (undated) DEVICE — PACK LAP CHOLE OR - (2EA/CA)

## (undated) DEVICE — STAPLER 45MM ARTICULATING - ENDO (3EA/BX)

## (undated) DEVICE — SUCTION INSTRUMENT YANKAUER BULBOUS TIP W/O VENT (50EA/CA)

## (undated) DEVICE — BAG RETRIEVAL 10ML (10EA/BX)

## (undated) DEVICE — DRAIN FLAT SUCTION 10MMX20CM - LATEX FREE (10EA/CA)

## (undated) DEVICE — GLOVE BIOGEL PI INDICATOR SZ 6.5 SURGICAL PF LF - (50/BX 4BX/CA)

## (undated) DEVICE — GLOVE BIOGEL SZ 6.5 SURGICAL PF LTX (50PR/BX 4BX/CA)

## (undated) DEVICE — STAPLE 45MM VASCULAR WHITE 2.5MM (12EA/BX)

## (undated) DEVICE — TOWEL STOP TIMEOUT SAFETY FLAG (40EA/CA)

## (undated) DEVICE — ELECTRODE DUAL RETURN W/ CORD - (50/PK)

## (undated) DEVICE — SODIUM CHL IRRIGATION 0.9% 1000ML (12EA/CA)

## (undated) DEVICE — TUBING CLEARLINK DUO-VENT - C-FLO (48EA/CA)

## (undated) DEVICE — LACTATED RINGERS INJ 1000 ML - (14EA/CA 60CA/PF)

## (undated) DEVICE — GLOVE BIOGEL INDICATOR SZ 6.5 SURGICAL PF LTX - (50PR/BX 4BX/CA)

## (undated) DEVICE — SUTURE ETHILON 2-0 FSLX 30 (36PK/BX)"